# Patient Record
Sex: MALE | Race: WHITE | Employment: FULL TIME | URBAN - METROPOLITAN AREA
[De-identification: names, ages, dates, MRNs, and addresses within clinical notes are randomized per-mention and may not be internally consistent; named-entity substitution may affect disease eponyms.]

---

## 2017-12-06 ENCOUNTER — HOSPITAL ENCOUNTER (EMERGENCY)
Age: 27
Discharge: HOME | End: 2017-12-06
Attending: EMERGENCY MEDICINE
Payer: COMMERCIAL

## 2017-12-06 ENCOUNTER — APPOINTMENT (OUTPATIENT)
Dept: CT IMAGING | Age: 27
End: 2017-12-06
Attending: EMERGENCY MEDICINE
Payer: COMMERCIAL

## 2017-12-06 ENCOUNTER — OFFICE VISIT (OUTPATIENT)
Dept: FAMILY MEDICINE CLINIC | Facility: CLINIC | Age: 27
End: 2017-12-06

## 2017-12-06 VITALS
DIASTOLIC BLOOD PRESSURE: 76 MMHG | OXYGEN SATURATION: 98 % | HEIGHT: 72.4 IN | SYSTOLIC BLOOD PRESSURE: 104 MMHG | RESPIRATION RATE: 16 BRPM | TEMPERATURE: 98 F | WEIGHT: 178 LBS | HEART RATE: 70 BPM | BODY MASS INDEX: 23.85 KG/M2

## 2017-12-06 VITALS
BODY MASS INDEX: 21.82 KG/M2 | HEIGHT: 74 IN | WEIGHT: 170 LBS | SYSTOLIC BLOOD PRESSURE: 124 MMHG | OXYGEN SATURATION: 100 % | DIASTOLIC BLOOD PRESSURE: 73 MMHG | TEMPERATURE: 98 F | RESPIRATION RATE: 18 BRPM | HEART RATE: 61 BPM

## 2017-12-06 DIAGNOSIS — K63.89 EPIPLOIC APPENDAGITIS: Primary | ICD-10-CM

## 2017-12-06 DIAGNOSIS — R10.32 ACUTE LEFT LOWER QUADRANT PAIN: Primary | ICD-10-CM

## 2017-12-06 PROCEDURE — 96361 HYDRATE IV INFUSION ADD-ON: CPT

## 2017-12-06 PROCEDURE — 85025 COMPLETE CBC W/AUTO DIFF WBC: CPT | Performed by: EMERGENCY MEDICINE

## 2017-12-06 PROCEDURE — 80053 COMPREHEN METABOLIC PANEL: CPT | Performed by: EMERGENCY MEDICINE

## 2017-12-06 PROCEDURE — 96374 THER/PROPH/DIAG INJ IV PUSH: CPT

## 2017-12-06 PROCEDURE — 74177 CT ABD & PELVIS W/CONTRAST: CPT | Performed by: EMERGENCY MEDICINE

## 2017-12-06 PROCEDURE — 99284 EMERGENCY DEPT VISIT MOD MDM: CPT

## 2017-12-06 RX ORDER — KETOROLAC TROMETHAMINE 30 MG/ML
30 INJECTION, SOLUTION INTRAMUSCULAR; INTRAVENOUS ONCE
Status: COMPLETED | OUTPATIENT
Start: 2017-12-06 | End: 2017-12-06

## 2017-12-06 RX ORDER — SODIUM CHLORIDE 9 MG/ML
INJECTION, SOLUTION INTRAVENOUS ONCE
Status: COMPLETED | OUTPATIENT
Start: 2017-12-06 | End: 2017-12-06

## 2017-12-06 RX ORDER — HYDROCODONE BITARTRATE AND ACETAMINOPHEN 5; 325 MG/1; MG/1
1-2 TABLET ORAL EVERY 6 HOURS PRN
Qty: 15 TABLET | Refills: 0 | Status: SHIPPED | OUTPATIENT
Start: 2017-12-06

## 2017-12-06 NOTE — PATIENT INSTRUCTIONS
- go to [place] emergency department for evaluation, as we are concerned about diverticulitis  - don't eat or drink anything in case you need further evaluation   Diverticulitis    Some people get pouches along the wall of the colon as they get older.  Paty Bains This cleans out the colon pouches that already exist and may prevent new ones from forming. Foods high in fiber include fresh fruits and edible peelings, raw or lightly cooked vegetables, whole grain cereals and breads, dried beans and peas, and bran.   Oth

## 2017-12-06 NOTE — ED PROVIDER NOTES
Patient Seen in: THE [facility] Emergency Department In [place]    History   Patient presents with:  Abdomen/Flank Pain (GI/)    Stated Complaint: abd pain    HPI    Patient is a 61-year-old male presents emergency room with history of left lower quadrant a stridor. LUNGS: Clear to auscultation bilaterally with no wheeze. There is good equal air entry bilaterally. HEART: Regular rate and rhythm. Normal S1, S2 no S3, or S4. No murmur.   ABDOMEN: There is mild focal tenderness to palpation appreciated to the l above.    Dictated by: Zhanna Bhatt MD on 12/06/2017 at 12:11     Approved by: Zhanna Bhatt MD                The patient had IV line established and have blood work drawn including a CBC, chemistries, BUN and creatinine, and blood sugar all of which are unr

## 2017-12-06 NOTE — PROGRESS NOTES
CHIEF COMPLAINT:   Patient presents with:  Abdominal Pain: Left side. 2 days. HPI:   Fab Luciano is a 24year old male who presents for complaints of abdominal pain that started on Monday night, 2 days ago.   The pain occurred 4 hours after eatin Sitting, Cuff Size: adult)   Pulse 70   Temp 98.3 °F (36.8 °C) (Oral)   Resp 16   Ht 72.4\"   Wt 178 lb   SpO2 98%   BMI 23.88 kg/m²   GENERAL: well developed, well nourished,in no apparent distress  SKIN: no rashes,no suspicious lesions  HEAD: atraumatic,

## 2017-12-06 NOTE — ED INITIAL ASSESSMENT (HPI)
C/o constant LLQ abd pain since Monday. No vomiting/diarrhea. Had regular BM today. Pain worse after eating. Denies of urinary symptoms.

## 2018-07-05 ENCOUNTER — OFFICE VISIT (OUTPATIENT)
Dept: FAMILY MEDICINE CLINIC | Facility: CLINIC | Age: 28
End: 2018-07-05

## 2018-07-05 VITALS
OXYGEN SATURATION: 99 % | HEIGHT: 74 IN | WEIGHT: 170 LBS | RESPIRATION RATE: 20 BRPM | TEMPERATURE: 98 F | SYSTOLIC BLOOD PRESSURE: 120 MMHG | DIASTOLIC BLOOD PRESSURE: 62 MMHG | HEART RATE: 57 BPM | BODY MASS INDEX: 21.82 KG/M2

## 2018-07-05 DIAGNOSIS — H60.331 ACUTE SWIMMER'S EAR OF RIGHT SIDE: ICD-10-CM

## 2018-07-05 DIAGNOSIS — H66.90 ACUTE OTITIS MEDIA, UNSPECIFIED OTITIS MEDIA TYPE: ICD-10-CM

## 2018-07-05 PROCEDURE — 99213 OFFICE O/P EST LOW 20 MIN: CPT | Performed by: NURSE PRACTITIONER

## 2018-07-05 RX ORDER — OMEPRAZOLE 10 MG/1
10 CAPSULE, DELAYED RELEASE ORAL DAILY
COMMUNITY

## 2018-07-05 RX ORDER — AMOXICILLIN 875 MG/1
875 TABLET, COATED ORAL 2 TIMES DAILY
Qty: 20 TABLET | Refills: 0 | Status: SHIPPED | OUTPATIENT
Start: 2018-07-05 | End: 2018-07-15

## 2018-07-05 NOTE — PROGRESS NOTES
CHIEF COMPLAINT:   Patient presents with:  Ear Pain: RT ear pain x 1 wk      HPI:   Arminda Dugan is a 34year old male who presents to clinic today with complaints of right ear pain. Has had for 1  weeks. Pain is described as throbbing.   Patient reports EARS: normal tragus no tender with manipulation. External auditory canals red on right, speck of metal noted in left canal. Right TM: red, intact, + bulging, + retraction,+ effusion, bony landmarks obsured.   Left TM: normal, no bulging, no retraction,no e Follow up with PCP if s/sx worsen, do not begin to improve in 3 days, or if fever of 100.4 or greater persists for 72 hours. Patient voiced understand and is in agreement with treatment plan.     Patient Instructions       External Ear Infection (Adult · If you feel water trapped in your ear, use ear drops right away. You can get these drops over the counter at most drugstores.  They work by removing water from the ear canal.  Follow-up care  Follow up with your healthcare provider in 1 week, or as advise · You may use over-the-counter medicine, such as acetaminophen or ibuprofen, to control pain and fever, unless something else was prescribed.  If you have chronic liver or kidney disease or have ever had a stomach ulcer or gastrointestinal bleeding, talk wi

## 2018-07-05 NOTE — PATIENT INSTRUCTIONS
External Ear Infection (Adult)    External otitis (also called “swimmer’s ear”) is an infection in the ear canal. It is often caused by bacteria or fungus. It can occur a few days after water gets trapped in the ear canal (from swimming or bathing).  It Call your healthcare provider right away if any of these occur:  · Ear pain becomes worse or doesn’t improve after 3 days of treatment  · Redness or swelling of the outer ear occurs or gets worse  · Headache  · Painful or stiff neck  · Drowsiness or confus When to seek medical advice  Call your healthcare provider right away if any of these occur:  · Ear pain gets worse or does not improve after 3 days of treatment  · Unusual drowsiness or confusion  · Neck pain, stiff neck, or headache  · Fluid or blood laith

## 2018-07-21 ENCOUNTER — OFFICE VISIT (OUTPATIENT)
Dept: FAMILY MEDICINE CLINIC | Facility: CLINIC | Age: 28
End: 2018-07-21
Payer: COMMERCIAL

## 2018-07-21 VITALS
OXYGEN SATURATION: 99 % | HEART RATE: 61 BPM | SYSTOLIC BLOOD PRESSURE: 100 MMHG | RESPIRATION RATE: 20 BRPM | HEIGHT: 74 IN | WEIGHT: 170 LBS | TEMPERATURE: 98 F | BODY MASS INDEX: 21.82 KG/M2 | DIASTOLIC BLOOD PRESSURE: 64 MMHG

## 2018-07-21 DIAGNOSIS — H65.191 ACUTE MEE (MIDDLE EAR EFFUSION), RIGHT: ICD-10-CM

## 2018-07-21 DIAGNOSIS — H60.311 ACUTE DIFFUSE OTITIS EXTERNA OF RIGHT EAR: Primary | ICD-10-CM

## 2018-07-21 PROCEDURE — 99213 OFFICE O/P EST LOW 20 MIN: CPT | Performed by: NURSE PRACTITIONER

## 2018-07-21 RX ORDER — CIPROFLOXACIN AND DEXAMETHASONE 3; 1 MG/ML; MG/ML
4 SUSPENSION/ DROPS AURICULAR (OTIC) 2 TIMES DAILY
Qty: 1 BOTTLE | Refills: 0 | Status: SHIPPED | OUTPATIENT
Start: 2018-07-21 | End: 2018-07-21

## 2018-07-21 NOTE — PROGRESS NOTES
CHIEF COMPLAINT:   Patient presents with:  Ear Pain: RT ear pain x 7/4      HPI:   Lavon Rasheed is a 34year old male who presents to clinic today with complaints of right ear pain. Has had for 3  weeks.  Pain is described as pressure, occasionally sharp EARS: bilateral tragus not tender with manipulation. External auditory canals with mild erythema and edema to right canal. Right TM: without erythema, no bulging, no retraction,+ effusion, bony landmarks visible.   Left TM: without erythema, no bulging, no · If fever, worse pain, ear swells shut, hearing loss, pain behind ear, go to the nearest emergency room, or call your primary doctor, or return to immediate care. · Tylenol(acetaminphen) and/or ibuprofen for pain.   · Do not exceed dosing on tylenol for w · You may use acetaminophen or ibuprofen to control pain, unless another medicine was prescribed.  Note: If you have chronic liver or kidney disease or ever had a stomach ulcer or GI bleeding, talk to your healthcare provider before taking any of these medi OME can happen when you have a cold if congestion blocks the passage that drains the middle ear. This passage is called the eustachian tube. OME may also occur with nasal allergies or after a bacterial middle ear infection.     The pain or discomfort may co · Antihistamines may help if you are also having allergy symptoms. · You may use medicines such as guaifenesin to thin mucus and promote drainage.   Follow-up care  Follow up with your healthcare provider or as advised if you are not feeling better after 3

## 2018-07-21 NOTE — PATIENT INSTRUCTIONS
FLonase as directed  Sudafed as needed  Benadryl at night      Otitis Externa   · Don't use Q-tips. Keep ear dry. Wear cotton ball in ear during shower. No swimming or head submersion for 7 days and infection cleared. · Use antibiotic drops x 7 days.  [facility] · Use prescribed ear drops as directed. These help reduce swelling and fight the infection. If an ear wick was placed in the ear canal, apply drops right onto the end of the wick.  The wick will draw the medicine into the ear canal even if it is swollen rena Earaches can happen without an infection. This occurs when air and fluid build up behind the eardrum causing a feeling of fullness and discomfort and reduced hearing. This is called otitis media with effusion (OME) or serous otitis media.  It means there is · You may use over-the-counter decongestants such as phenylephrine or pseudoephedrine. But they are not always helpful. Don't use nasal spray decongestants more than 3 days. Longer use can make congestion worse.  Prescription nasal sprays from your doctor d

## 2021-12-30 ENCOUNTER — HOSPITAL ENCOUNTER (EMERGENCY)
Age: 31
Discharge: HOME | End: 2021-12-30
Attending: EMERGENCY MEDICINE
Payer: COMMERCIAL

## 2021-12-30 VITALS
OXYGEN SATURATION: 99 % | HEART RATE: 70 BPM | WEIGHT: 180 LBS | BODY MASS INDEX: 23 KG/M2 | RESPIRATION RATE: 20 BRPM | TEMPERATURE: 98 F | DIASTOLIC BLOOD PRESSURE: 77 MMHG | SYSTOLIC BLOOD PRESSURE: 120 MMHG

## 2021-12-30 DIAGNOSIS — Z51.89 VISIT FOR WOUND CHECK: Primary | ICD-10-CM

## 2021-12-30 PROCEDURE — 99281 EMR DPT VST MAYX REQ PHY/QHP: CPT | Performed by: EMERGENCY MEDICINE

## 2021-12-31 NOTE — ED PROVIDER NOTES
Patient Seen in: [facility] Emergency Department In [place]      History   Patient presents with:  Laceration/Abrasion    Stated Complaint: left thumb lac 12/26 and here for f/u    Subjective:   HPI    Patient is a pleasant 56-year-old male.   4 days prior of lidocaine and was then able to successfully remove the remaining of the Surgicel. Small amount of active bleeding. Site was thoroughly cleaned. We will now switch to a nonadherent dressing and bulky tube gauze. He is to change daily.
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
## 2022-10-26 ENCOUNTER — OFFICE VISIT (OUTPATIENT)
Dept: FAMILY MEDICINE CLINIC | Facility: CLINIC | Age: 32
End: 2022-10-26
Payer: COMMERCIAL

## 2022-10-26 VITALS
HEIGHT: 72.05 IN | HEART RATE: 74 BPM | TEMPERATURE: 97 F | SYSTOLIC BLOOD PRESSURE: 126 MMHG | DIASTOLIC BLOOD PRESSURE: 68 MMHG | RESPIRATION RATE: 18 BRPM | WEIGHT: 175 LBS | BODY MASS INDEX: 23.7 KG/M2 | OXYGEN SATURATION: 97 %

## 2022-10-26 DIAGNOSIS — Z13.21 SCREENING FOR ENDOCRINE, NUTRITIONAL, METABOLIC AND IMMUNITY DISORDER: ICD-10-CM

## 2022-10-26 DIAGNOSIS — Z13.228 SCREENING FOR ENDOCRINE, NUTRITIONAL, METABOLIC AND IMMUNITY DISORDER: ICD-10-CM

## 2022-10-26 DIAGNOSIS — Z13.29 SCREENING FOR ENDOCRINE, NUTRITIONAL, METABOLIC AND IMMUNITY DISORDER: ICD-10-CM

## 2022-10-26 DIAGNOSIS — Z13.0 SCREENING FOR ENDOCRINE, NUTRITIONAL, METABOLIC AND IMMUNITY DISORDER: ICD-10-CM

## 2022-10-26 DIAGNOSIS — F41.1 GAD (GENERALIZED ANXIETY DISORDER): ICD-10-CM

## 2022-10-26 DIAGNOSIS — Z13.6 ENCOUNTER FOR LIPID SCREENING FOR CARDIOVASCULAR DISEASE: ICD-10-CM

## 2022-10-26 DIAGNOSIS — F90.0 ATTENTION DEFICIT HYPERACTIVITY DISORDER (ADHD), PREDOMINANTLY INATTENTIVE TYPE: ICD-10-CM

## 2022-10-26 DIAGNOSIS — R00.2 PALPITATIONS: ICD-10-CM

## 2022-10-26 DIAGNOSIS — Z13.220 ENCOUNTER FOR LIPID SCREENING FOR CARDIOVASCULAR DISEASE: ICD-10-CM

## 2022-10-26 DIAGNOSIS — G57.93 NEUROPATHY INVOLVING BOTH LOWER EXTREMITIES: ICD-10-CM

## 2022-10-26 DIAGNOSIS — F32.1 MODERATE MAJOR DEPRESSION (HCC): ICD-10-CM

## 2022-10-26 DIAGNOSIS — Z00.00 WELL ADULT EXAM: Primary | ICD-10-CM

## 2022-10-26 DIAGNOSIS — G43.109 MIGRAINE WITH AURA AND WITHOUT STATUS MIGRAINOSUS, NOT INTRACTABLE: ICD-10-CM

## 2022-10-26 PROCEDURE — 3008F BODY MASS INDEX DOCD: CPT | Performed by: FAMILY MEDICINE

## 2022-10-26 PROCEDURE — 3078F DIAST BP <80 MM HG: CPT | Performed by: FAMILY MEDICINE

## 2022-10-26 PROCEDURE — 99385 PREV VISIT NEW AGE 18-39: CPT | Performed by: FAMILY MEDICINE

## 2022-10-26 PROCEDURE — 3074F SYST BP LT 130 MM HG: CPT | Performed by: FAMILY MEDICINE

## 2022-10-26 PROCEDURE — 99214 OFFICE O/P EST MOD 30 MIN: CPT | Performed by: FAMILY MEDICINE

## 2022-10-26 RX ORDER — SUMATRIPTAN 100 MG/1
TABLET, FILM COATED ORAL
Qty: 9 TABLET | Refills: 0 | Status: SHIPPED | OUTPATIENT
Start: 2022-10-26

## 2022-10-27 PROBLEM — F90.0 ATTENTION DEFICIT HYPERACTIVITY DISORDER (ADHD), PREDOMINANTLY INATTENTIVE TYPE: Status: ACTIVE | Noted: 2022-10-27

## 2022-10-27 PROBLEM — G57.93 NEUROPATHY INVOLVING BOTH LOWER EXTREMITIES: Status: ACTIVE | Noted: 2022-10-27

## 2022-10-27 PROBLEM — G43.109 MIGRAINE WITH AURA AND WITHOUT STATUS MIGRAINOSUS, NOT INTRACTABLE: Status: ACTIVE | Noted: 2022-10-27

## 2022-10-27 PROBLEM — F41.1 GAD (GENERALIZED ANXIETY DISORDER): Status: ACTIVE | Noted: 2022-10-27

## 2022-10-27 PROBLEM — F32.1 MODERATE MAJOR DEPRESSION (HCC): Status: ACTIVE | Noted: 2022-10-27

## 2022-10-27 PROBLEM — R00.2 PALPITATIONS: Status: ACTIVE | Noted: 2022-10-27

## 2022-11-13 LAB
ABSOLUTE BASOPHILS: 32 CELLS/UL (ref 0–200)
ABSOLUTE EOSINOPHILS: 122 CELLS/UL (ref 15–500)
ABSOLUTE LYMPHOCYTES: 1498 CELLS/UL (ref 850–3900)
ABSOLUTE MONOCYTES: 710 CELLS/UL (ref 200–950)
ABSOLUTE NEUTROPHILS: 4038 CELLS/UL (ref 1500–7800)
ALBUMIN/GLOBULIN RATIO: 2 (CALC) (ref 1–2.5)
ALBUMIN: 5 G/DL (ref 3.6–5.1)
ALKALINE PHOSPHATASE: 80 U/L (ref 36–130)
ALT: 85 U/L (ref 9–46)
AST: 53 U/L (ref 10–40)
BASOPHILS: 0.5 %
BILIRUBIN, TOTAL: 0.6 MG/DL (ref 0.2–1.2)
BUN: 17 MG/DL (ref 7–25)
CALCIUM: 9.9 MG/DL (ref 8.6–10.3)
CARBON DIOXIDE: 31 MMOL/L (ref 20–32)
CHLORIDE: 103 MMOL/L (ref 98–110)
CHOL/HDLC RATIO: 3.5 (CALC)
CHOLESTEROL, TOTAL: 187 MG/DL
CREATININE: 0.81 MG/DL (ref 0.6–1.26)
EGFR: 122 ML/MIN/1.73M2
EOSINOPHILS: 1.9 %
GLOBULIN: 2.5 G/DL (CALC) (ref 1.9–3.7)
GLUCOSE: 78 MG/DL (ref 65–99)
HDL CHOLESTEROL: 53 MG/DL
HEMATOCRIT: 50.4 % (ref 38.5–50)
HEMOGLOBIN: 17.3 G/DL (ref 13.2–17.1)
LDL-CHOLESTEROL: 110 MG/DL (CALC)
LYMPHOCYTES: 23.4 %
MAGNESIUM: 2.1 MG/DL (ref 1.5–2.5)
MCH: 30.2 PG (ref 27–33)
MCHC: 34.3 G/DL (ref 32–36)
MCV: 88.1 FL (ref 80–100)
MONOCYTES: 11.1 %
MPV: 11.3 FL (ref 7.5–12.5)
NEUTROPHILS: 63.1 %
NON-HDL CHOLESTEROL: 134 MG/DL (CALC)
PLATELET COUNT: 200 THOUSAND/UL (ref 140–400)
POTASSIUM: 4.1 MMOL/L (ref 3.5–5.3)
PROTEIN, TOTAL: 7.5 G/DL (ref 6.1–8.1)
RDW: 12.4 % (ref 11–15)
RED BLOOD CELL COUNT: 5.72 MILLION/UL (ref 4.2–5.8)
SODIUM: 141 MMOL/L (ref 135–146)
TRIGLYCERIDES: 126 MG/DL
TSH: 0.56 MIU/L (ref 0.4–4.5)
VITAMIN B12: 512 PG/ML (ref 200–1100)
WHITE BLOOD CELL COUNT: 6.4 THOUSAND/UL (ref 3.8–10.8)

## 2022-11-14 DIAGNOSIS — R74.8 ELEVATED LIVER ENZYMES: Primary | ICD-10-CM

## 2022-11-14 DIAGNOSIS — R71.8 OTHER ABNORMALITY OF RED BLOOD CELLS: ICD-10-CM

## 2022-11-16 ENCOUNTER — TELEPHONE (OUTPATIENT)
Dept: FAMILY MEDICINE CLINIC | Facility: CLINIC | Age: 32
End: 2022-11-16

## 2022-11-16 DIAGNOSIS — R74.8 ELEVATED LIVER ENZYMES: Primary | ICD-10-CM

## 2022-11-16 NOTE — TELEPHONE ENCOUNTER
----- Message from Beba Grider PA-C sent at 11/16/2022  4:41 AM CST -----  Needs acute hepatitis panel added not the LFT     Poly  Can you contact [facility] and have them delete the hepatic panel you added and have them due the Acute Hepatitis Panel instead per Devante Hill above instructions.

## 2022-11-16 NOTE — TELEPHONE ENCOUNTER
Request made to [facility] to add on Acute Hepatitis panel and cancel the HFP requested yesterday. Order pended. Can you check for accuracy please?

## 2022-11-21 LAB
ABSOLUTE BASOPHILS: 32 CELLS/UL (ref 0–200)
ABSOLUTE EOSINOPHILS: 122 CELLS/UL (ref 15–500)
ABSOLUTE LYMPHOCYTES: 1498 CELLS/UL (ref 850–3900)
ABSOLUTE MONOCYTES: 710 CELLS/UL (ref 200–950)
ABSOLUTE NEUTROPHILS: 4038 CELLS/UL (ref 1500–7800)
ALBUMIN/GLOBULIN RATIO: 2 (CALC) (ref 1–2.5)
ALBUMIN/GLOBULIN RATIO: 2 (CALC) (ref 1–2.5)
ALBUMIN: 5 G/DL (ref 3.6–5.1)
ALBUMIN: 5 G/DL (ref 3.6–5.1)
ALKALINE PHOSPHATASE: 80 U/L (ref 36–130)
ALKALINE PHOSPHATASE: 80 U/L (ref 36–130)
ALT: 85 U/L (ref 9–46)
ALT: 85 U/L (ref 9–46)
AST: 53 U/L (ref 10–40)
AST: 53 U/L (ref 10–40)
BASOPHILS: 0.5 %
BILIRUBIN, DIRECT: 0.1 MG/DL
BILIRUBIN, INDIRECT: 0.5 MG/DL (CALC) (ref 0.2–1.2)
BILIRUBIN, TOTAL: 0.6 MG/DL (ref 0.2–1.2)
BILIRUBIN, TOTAL: 0.6 MG/DL (ref 0.2–1.2)
BUN: 17 MG/DL (ref 7–25)
CALCIUM: 9.9 MG/DL (ref 8.6–10.3)
CARBON DIOXIDE: 31 MMOL/L (ref 20–32)
CHLORIDE: 103 MMOL/L (ref 98–110)
CHOL/HDLC RATIO: 3.5 (CALC)
CHOLESTEROL, TOTAL: 187 MG/DL
CREATININE: 0.81 MG/DL (ref 0.6–1.26)
EGFR: 122 ML/MIN/1.73M2
EOSINOPHILS: 1.9 %
GLOBULIN: 2.5 G/DL (CALC) (ref 1.9–3.7)
GLOBULIN: 2.5 G/DL (CALC) (ref 1.9–3.7)
GLUCOSE: 78 MG/DL (ref 65–99)
HDL CHOLESTEROL: 53 MG/DL
HEMATOCRIT: 50.4 % (ref 38.5–50)
HEMOGLOBIN: 17.3 G/DL (ref 13.2–17.1)
LDL-CHOLESTEROL: 110 MG/DL (CALC)
LYMPHOCYTES: 23.4 %
MAGNESIUM: 2.1 MG/DL (ref 1.5–2.5)
MCH: 30.2 PG (ref 27–33)
MCHC: 34.3 G/DL (ref 32–36)
MCV: 88.1 FL (ref 80–100)
MONOCYTES: 11.1 %
MPV: 11.3 FL (ref 7.5–12.5)
NEUTROPHILS: 63.1 %
NON-HDL CHOLESTEROL: 134 MG/DL (CALC)
PLATELET COUNT: 200 THOUSAND/UL (ref 140–400)
POTASSIUM: 4.1 MMOL/L (ref 3.5–5.3)
PROTEIN, TOTAL: 7.5 G/DL (ref 6.1–8.1)
PROTEIN, TOTAL: 7.5 G/DL (ref 6.1–8.1)
RDW: 12.4 % (ref 11–15)
RED BLOOD CELL COUNT: 5.72 MILLION/UL (ref 4.2–5.8)
SIGNAL TO CUT-OFF: 0.02
SODIUM: 141 MMOL/L (ref 135–146)
TRIGLYCERIDES: 126 MG/DL
TSH: 0.56 MIU/L (ref 0.4–4.5)
VITAMIN B12: 512 PG/ML (ref 200–1100)
WHITE BLOOD CELL COUNT: 6.4 THOUSAND/UL (ref 3.8–10.8)

## 2022-11-28 ENCOUNTER — OFFICE VISIT (OUTPATIENT)
Dept: FAMILY MEDICINE CLINIC | Facility: CLINIC | Age: 32
End: 2022-11-28
Payer: COMMERCIAL

## 2022-11-28 VITALS
BODY MASS INDEX: 23.98 KG/M2 | HEART RATE: 74 BPM | WEIGHT: 177 LBS | OXYGEN SATURATION: 98 % | DIASTOLIC BLOOD PRESSURE: 74 MMHG | HEIGHT: 72 IN | TEMPERATURE: 100 F | RESPIRATION RATE: 18 BRPM | SYSTOLIC BLOOD PRESSURE: 130 MMHG

## 2022-11-28 DIAGNOSIS — F90.0 ATTENTION DEFICIT HYPERACTIVITY DISORDER (ADHD), PREDOMINANTLY INATTENTIVE TYPE: ICD-10-CM

## 2022-11-28 DIAGNOSIS — F32.1 MODERATE MAJOR DEPRESSION (HCC): ICD-10-CM

## 2022-11-28 DIAGNOSIS — R79.89 ELEVATED LIVER FUNCTION TESTS: ICD-10-CM

## 2022-11-28 DIAGNOSIS — R00.2 PALPITATIONS: ICD-10-CM

## 2022-11-28 DIAGNOSIS — G44.209 TENSION HEADACHE: ICD-10-CM

## 2022-11-28 DIAGNOSIS — G43.109 MIGRAINE WITH AURA AND WITHOUT STATUS MIGRAINOSUS, NOT INTRACTABLE: Primary | ICD-10-CM

## 2022-11-28 DIAGNOSIS — F41.1 GAD (GENERALIZED ANXIETY DISORDER): ICD-10-CM

## 2022-11-28 DIAGNOSIS — Z23 NEED FOR INFLUENZA VACCINATION: ICD-10-CM

## 2022-11-28 DIAGNOSIS — Z79.899 NEW MEDICATION ADDED: ICD-10-CM

## 2022-11-28 PROCEDURE — 90686 IIV4 VACC NO PRSV 0.5 ML IM: CPT | Performed by: FAMILY MEDICINE

## 2022-11-28 PROCEDURE — 3008F BODY MASS INDEX DOCD: CPT | Performed by: FAMILY MEDICINE

## 2022-11-28 PROCEDURE — 90471 IMMUNIZATION ADMIN: CPT | Performed by: FAMILY MEDICINE

## 2022-11-28 PROCEDURE — 3078F DIAST BP <80 MM HG: CPT | Performed by: FAMILY MEDICINE

## 2022-11-28 PROCEDURE — 99215 OFFICE O/P EST HI 40 MIN: CPT | Performed by: FAMILY MEDICINE

## 2022-11-28 PROCEDURE — 93000 ELECTROCARDIOGRAM COMPLETE: CPT | Performed by: FAMILY MEDICINE

## 2022-11-28 PROCEDURE — 3075F SYST BP GE 130 - 139MM HG: CPT | Performed by: FAMILY MEDICINE

## 2022-11-28 RX ORDER — PROPRANOLOL HCL 60 MG
60 CAPSULE, EXTENDED RELEASE 24HR ORAL DAILY
Qty: 30 CAPSULE | Refills: 1 | Status: SHIPPED | OUTPATIENT
Start: 2022-11-28 | End: 2022-12-28

## 2022-11-28 RX ORDER — FLUOXETINE HYDROCHLORIDE 20 MG/1
20 CAPSULE ORAL DAILY
Qty: 30 CAPSULE | Refills: 1 | Status: SHIPPED | OUTPATIENT
Start: 2022-11-28

## 2022-11-29 PROBLEM — R79.89 ELEVATED LIVER FUNCTION TESTS: Status: ACTIVE | Noted: 2022-11-29

## 2022-12-29 ENCOUNTER — TELEPHONE (OUTPATIENT)
Dept: FAMILY MEDICINE CLINIC | Facility: CLINIC | Age: 32
End: 2022-12-29

## 2022-12-29 DIAGNOSIS — R00.2 PALPITATIONS: Primary | ICD-10-CM

## 2022-12-29 DIAGNOSIS — R79.89 ELEVATED LIVER FUNCTION TESTS: Primary | ICD-10-CM

## 2022-12-29 LAB
ABSOLUTE BASOPHILS: 28 CELLS/UL (ref 0–200)
ABSOLUTE EOSINOPHILS: 78 CELLS/UL (ref 15–500)
ABSOLUTE LYMPHOCYTES: 1618 CELLS/UL (ref 850–3900)
ABSOLUTE MONOCYTES: 661 CELLS/UL (ref 200–950)
ABSOLUTE NEUTROPHILS: 3214 CELLS/UL (ref 1500–7800)
ALBUMIN/GLOBULIN RATIO: 1.9 (CALC) (ref 1–2.5)
ALBUMIN: 4.9 G/DL (ref 3.6–5.1)
ALKALINE PHOSPHATASE: 84 U/L (ref 36–130)
ALT: 50 U/L (ref 9–46)
AST: 22 U/L (ref 10–40)
BASOPHILS: 0.5 %
BILIRUBIN, DIRECT: 0.1 MG/DL
BILIRUBIN, INDIRECT: 0.7 MG/DL (CALC) (ref 0.2–1.2)
BILIRUBIN, TOTAL: 0.8 MG/DL (ref 0.2–1.2)
EOSINOPHILS: 1.4 %
GLOBULIN: 2.6 G/DL (CALC) (ref 1.9–3.7)
HEMATOCRIT: 47.8 % (ref 38.5–50)
HEMOGLOBIN: 16.6 G/DL (ref 13.2–17.1)
LYMPHOCYTES: 28.9 %
MCH: 30 PG (ref 27–33)
MCHC: 34.7 G/DL (ref 32–36)
MCV: 86.4 FL (ref 80–100)
MONOCYTES: 11.8 %
MPV: 11.6 FL (ref 7.5–12.5)
NEUTROPHILS: 57.4 %
PLATELET COUNT: 205 THOUSAND/UL (ref 140–400)
PROTEIN, TOTAL: 7.5 G/DL (ref 6.1–8.1)
RDW: 12.3 % (ref 11–15)
RED BLOOD CELL COUNT: 5.53 MILLION/UL (ref 4.2–5.8)
SIGNAL TO CUT-OFF: 0.15
WHITE BLOOD CELL COUNT: 5.6 THOUSAND/UL (ref 3.8–10.8)

## 2022-12-29 NOTE — TELEPHONE ENCOUNTER
----- Message from Ralph Song PA-C sent at 12/29/2022  5:23 AM CST -----  Liver function tests are improving continue with trying to avoid processed food, acetaminophen and alcohol and repeat liver function tests in 3 months. Complete blood count is normal no repeat is needed in 6 months as prior directed this was done a little early but since it is normal no repeat needed. LFT in 3 months    Provider placed lab order and left vm for pt with above results/instructions.

## 2022-12-29 NOTE — TELEPHONE ENCOUNTER
----- Message from Cristina Leggett PA-C sent at 12/29/2022  5:21 AM CST -----  A CBC was supposed to be done in 6 months not now and hepatitis panel was done twice,  it was not supposed to be repeated it was just added on in November. This was an error on our part I believe.   See other TE from today

## 2023-01-06 ENCOUNTER — TELEPHONE (OUTPATIENT)
Dept: FAMILY MEDICINE CLINIC | Facility: CLINIC | Age: 33
End: 2023-01-06

## 2023-01-07 PROBLEM — F33.40 DEPRESSION, MAJOR, RECURRENT, IN REMISSION (HCC): Status: ACTIVE | Noted: 2023-01-07

## 2023-01-07 PROBLEM — Z79.899 MEDICATION MANAGEMENT: Status: ACTIVE | Noted: 2023-01-07

## 2023-01-07 PROBLEM — F32.1 MODERATE MAJOR DEPRESSION (HCC): Status: RESOLVED | Noted: 2022-10-27 | Resolved: 2023-01-07

## 2023-01-07 PROBLEM — F33.40 DEPRESSION, MAJOR, RECURRENT, IN REMISSION: Status: ACTIVE | Noted: 2023-01-07

## 2023-01-09 NOTE — TELEPHONE ENCOUNTER
PA for Vyvanse denied by plan. I did advise patient to call plan to see if there is a formulary alternative. He will do so and call us back.

## 2023-02-10 PROBLEM — G57.93 NEUROPATHY INVOLVING BOTH LOWER EXTREMITIES: Status: RESOLVED | Noted: 2022-10-27 | Resolved: 2023-02-10

## 2023-02-10 PROBLEM — R00.2 PALPITATIONS: Status: RESOLVED | Noted: 2022-10-27 | Resolved: 2023-02-10

## 2023-03-19 DIAGNOSIS — F90.0 ATTENTION DEFICIT HYPERACTIVITY DISORDER (ADHD), PREDOMINANTLY INATTENTIVE TYPE: ICD-10-CM

## 2023-03-20 RX ORDER — DEXMETHYLPHENIDATE HYDROCHLORIDE 15 MG/1
15 CAPSULE, EXTENDED RELEASE ORAL DAILY
Qty: 30 CAPSULE | Refills: 0 | OUTPATIENT
Start: 2023-03-20 | End: 2023-04-19

## 2023-04-07 DIAGNOSIS — F41.1 GAD (GENERALIZED ANXIETY DISORDER): ICD-10-CM

## 2023-04-07 DIAGNOSIS — F33.40 DEPRESSION, MAJOR, RECURRENT, IN REMISSION (HCC): ICD-10-CM

## 2023-04-07 RX ORDER — FLUOXETINE HYDROCHLORIDE 20 MG/1
20 CAPSULE ORAL DAILY
Qty: 90 CAPSULE | Refills: 0 | Status: SHIPPED | OUTPATIENT
Start: 2023-04-07

## 2023-04-07 RX ORDER — FLUOXETINE 10 MG/1
10 CAPSULE ORAL DAILY
Qty: 90 CAPSULE | Refills: 0 | Status: SHIPPED | OUTPATIENT
Start: 2023-04-07

## 2023-05-16 PROBLEM — K21.9 GASTROESOPHAGEAL REFLUX DISEASE WITHOUT ESOPHAGITIS: Status: ACTIVE | Noted: 2023-05-16

## 2023-07-04 ENCOUNTER — APPOINTMENT (OUTPATIENT)
Dept: GENERAL RADIOLOGY | Age: 33
End: 2023-07-04
Attending: EMERGENCY MEDICINE
Payer: COMMERCIAL

## 2023-07-04 ENCOUNTER — APPOINTMENT (OUTPATIENT)
Dept: CT IMAGING | Age: 33
End: 2023-07-04
Attending: EMERGENCY MEDICINE
Payer: COMMERCIAL

## 2023-07-04 ENCOUNTER — APPOINTMENT (OUTPATIENT)
Dept: ULTRASOUND IMAGING | Age: 33
End: 2023-07-04
Attending: EMERGENCY MEDICINE
Payer: COMMERCIAL

## 2023-07-04 ENCOUNTER — HOSPITAL ENCOUNTER (EMERGENCY)
Age: 33
Discharge: HOME | End: 2023-07-04
Attending: EMERGENCY MEDICINE
Payer: COMMERCIAL

## 2023-07-04 VITALS
RESPIRATION RATE: 16 BRPM | TEMPERATURE: 98 F | HEART RATE: 102 BPM | HEIGHT: 73 IN | DIASTOLIC BLOOD PRESSURE: 70 MMHG | WEIGHT: 171 LBS | OXYGEN SATURATION: 99 % | BODY MASS INDEX: 22.66 KG/M2 | SYSTOLIC BLOOD PRESSURE: 120 MMHG
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
## 2023-07-04 DIAGNOSIS — S62.002A CLOSED NONDISPLACED FRACTURE OF SCAPHOID OF LEFT WRIST, UNSPECIFIED PORTION OF SCAPHOID, INITIAL ENCOUNTER: ICD-10-CM

## 2023-07-04 DIAGNOSIS — S30.201A TRAUMATIC HEMATOMA OF TESTICLE: Primary | ICD-10-CM

## 2023-07-04 DIAGNOSIS — T07.XXXA ABRASIONS OF MULTIPLE SITES: ICD-10-CM

## 2023-07-04 LAB
ALBUMIN SERPL-MCNC: 4.2 G/DL (ref 3.4–5)
ALBUMIN/GLOB SERPL: 1.2 {RATIO} (ref 1–2)
ALP LIVER SERPL-CCNC: 89 U/L
ALT SERPL-CCNC: 76 U/L
ANION GAP SERPL CALC-SCNC: 2 MMOL/L (ref 0–18)
APTT PPP: 26.1 SECONDS (ref 23.3–35.6)
AST SERPL-CCNC: 44 U/L (ref 15–37)
BASOPHILS # BLD AUTO: 0.02 X10(3) UL (ref 0–0.2)
BASOPHILS NFR BLD AUTO: 0.2 %
BILIRUB SERPL-MCNC: 1.5 MG/DL (ref 0.1–2)
BILIRUB UR QL STRIP.AUTO: NEGATIVE
BUN BLD-MCNC: 16 MG/DL (ref 7–18)
CALCIUM BLD-MCNC: 9 MG/DL (ref 8.5–10.1)
CHLORIDE SERPL-SCNC: 106 MMOL/L (ref 98–112)
CLARITY UR REFRACT.AUTO: CLEAR
CO2 SERPL-SCNC: 27 MMOL/L (ref 21–32)
COLOR UR AUTO: YELLOW
CREAT BLD-MCNC: 0.72 MG/DL
EOSINOPHIL # BLD AUTO: 0.13 X10(3) UL (ref 0–0.7)
EOSINOPHIL NFR BLD AUTO: 1.2 %
ERYTHROCYTE [DISTWIDTH] IN BLOOD BY AUTOMATED COUNT: 12.6 %
ETHANOL SERPL-MCNC: <3 MG/DL (ref ?–3)
GFR SERPLBLD BASED ON 1.73 SQ M-ARVRAT: 125 ML/MIN/1.73M2 (ref 60–?)
GLOBULIN PLAS-MCNC: 3.5 G/DL (ref 2.8–4.4)
GLUCOSE BLD-MCNC: 93 MG/DL (ref 70–99)
GLUCOSE UR STRIP.AUTO-MCNC: NEGATIVE MG/DL
HCT VFR BLD AUTO: 46.6 %
HGB BLD-MCNC: 16.2 G/DL
IMM GRANULOCYTES # BLD AUTO: 0.04 X10(3) UL (ref 0–1)
IMM GRANULOCYTES NFR BLD: 0.4 %
INR BLD: 1.03 (ref 0.85–1.16)
LEUKOCYTE ESTERASE UR QL STRIP.AUTO: NEGATIVE
LIPASE SERPL-CCNC: 33 U/L (ref 13–75)
LYMPHOCYTES # BLD AUTO: 0.98 X10(3) UL (ref 1–4)
LYMPHOCYTES NFR BLD AUTO: 9 %
MCH RBC QN AUTO: 30.3 PG (ref 26–34)
MCHC RBC AUTO-ENTMCNC: 34.8 G/DL (ref 31–37)
MCV RBC AUTO: 87.1 FL
MONOCYTES # BLD AUTO: 1.52 X10(3) UL (ref 0.1–1)
MONOCYTES NFR BLD AUTO: 14 %
NEUTROPHILS # BLD AUTO: 8.16 X10 (3) UL (ref 1.5–7.7)
NEUTROPHILS # BLD AUTO: 8.16 X10(3) UL (ref 1.5–7.7)
NEUTROPHILS NFR BLD AUTO: 75.2 %
NITRITE UR QL STRIP.AUTO: NEGATIVE
OSMOLALITY SERPL CALC.SUM OF ELEC: 281 MOSM/KG (ref 275–295)
PH UR STRIP.AUTO: 6 [PH] (ref 5–8)
PLATELET # BLD AUTO: 181 10(3)UL (ref 150–450)
POTASSIUM SERPL-SCNC: 3.8 MMOL/L (ref 3.5–5.1)
PROT SERPL-MCNC: 7.7 G/DL (ref 6.4–8.2)
PROTHROMBIN TIME: 13.5 SECONDS (ref 11.6–14.8)
RBC # BLD AUTO: 5.35 X10(6)UL
RBC UR QL AUTO: NEGATIVE
SODIUM SERPL-SCNC: 135 MMOL/L (ref 136–145)
SP GR UR STRIP.AUTO: 1.01 (ref 1–1.03)
TROPONIN I HIGH SENSITIVITY: <3 NG/L
UROBILINOGEN UR STRIP.AUTO-MCNC: 0.2 MG/DL
WBC # BLD AUTO: 10.9 X10(3) UL (ref 4–11)

## 2023-07-04 PROCEDURE — 96376 TX/PRO/DX INJ SAME DRUG ADON: CPT

## 2023-07-04 PROCEDURE — 99285 EMERGENCY DEPT VISIT HI MDM: CPT

## 2023-07-04 PROCEDURE — 73110 X-RAY EXAM OF WRIST: CPT | Performed by: EMERGENCY MEDICINE

## 2023-07-04 PROCEDURE — 93005 ELECTROCARDIOGRAM TRACING: CPT

## 2023-07-04 PROCEDURE — 93010 ELECTROCARDIOGRAM REPORT: CPT

## 2023-07-04 PROCEDURE — 85730 THROMBOPLASTIN TIME PARTIAL: CPT | Performed by: EMERGENCY MEDICINE

## 2023-07-04 PROCEDURE — 73552 X-RAY EXAM OF FEMUR 2/>: CPT | Performed by: EMERGENCY MEDICINE

## 2023-07-04 PROCEDURE — 96374 THER/PROPH/DIAG INJ IV PUSH: CPT

## 2023-07-04 PROCEDURE — 73610 X-RAY EXAM OF ANKLE: CPT | Performed by: EMERGENCY MEDICINE

## 2023-07-04 PROCEDURE — 71260 CT THORAX DX C+: CPT | Performed by: EMERGENCY MEDICINE

## 2023-07-04 PROCEDURE — 81003 URINALYSIS AUTO W/O SCOPE: CPT | Performed by: EMERGENCY MEDICINE

## 2023-07-04 PROCEDURE — 85610 PROTHROMBIN TIME: CPT | Performed by: EMERGENCY MEDICINE

## 2023-07-04 PROCEDURE — 73630 X-RAY EXAM OF FOOT: CPT | Performed by: EMERGENCY MEDICINE

## 2023-07-04 PROCEDURE — 73562 X-RAY EXAM OF KNEE 3: CPT | Performed by: EMERGENCY MEDICINE

## 2023-07-04 PROCEDURE — 84484 ASSAY OF TROPONIN QUANT: CPT | Performed by: EMERGENCY MEDICINE

## 2023-07-04 PROCEDURE — 85025 COMPLETE CBC W/AUTO DIFF WBC: CPT | Performed by: EMERGENCY MEDICINE

## 2023-07-04 PROCEDURE — 93975 VASCULAR STUDY: CPT | Performed by: EMERGENCY MEDICINE

## 2023-07-04 PROCEDURE — 74177 CT ABD & PELVIS W/CONTRAST: CPT | Performed by: EMERGENCY MEDICINE

## 2023-07-04 PROCEDURE — 96361 HYDRATE IV INFUSION ADD-ON: CPT

## 2023-07-04 PROCEDURE — 76870 US EXAM SCROTUM: CPT | Performed by: EMERGENCY MEDICINE

## 2023-07-04 PROCEDURE — 73130 X-RAY EXAM OF HAND: CPT | Performed by: EMERGENCY MEDICINE

## 2023-07-04 PROCEDURE — 82077 ASSAY SPEC XCP UR&BREATH IA: CPT | Performed by: EMERGENCY MEDICINE

## 2023-07-04 PROCEDURE — 83690 ASSAY OF LIPASE: CPT | Performed by: EMERGENCY MEDICINE

## 2023-07-04 PROCEDURE — 80053 COMPREHEN METABOLIC PANEL: CPT | Performed by: EMERGENCY MEDICINE

## 2023-07-04 RX ORDER — NAPROXEN 500 MG/1
500 TABLET ORAL 2 TIMES DAILY PRN
Qty: 20 TABLET | Refills: 0 | Status: SHIPPED | OUTPATIENT
Start: 2023-07-04 | End: 2023-07-14

## 2023-07-04 RX ORDER — NAPROXEN 500 MG/1
500 TABLET ORAL 2 TIMES DAILY PRN
Qty: 20 TABLET | Refills: 0 | Status: SHIPPED | OUTPATIENT
Start: 2023-07-04 | End: 2023-07-04 | Stop reason: CLARIF

## 2023-07-04 RX ORDER — HYDROCODONE BITARTRATE AND ACETAMINOPHEN 5; 325 MG/1; MG/1
1 TABLET ORAL EVERY 6 HOURS PRN
Qty: 10 TABLET | Refills: 0 | Status: SHIPPED | OUTPATIENT
Start: 2023-07-04 | End: 2023-07-09

## 2023-07-04 RX ORDER — MORPHINE SULFATE 4 MG/ML
4 INJECTION, SOLUTION INTRAMUSCULAR; INTRAVENOUS ONCE
Status: COMPLETED | OUTPATIENT
Start: 2023-07-04 | End: 2023-07-04

## 2023-07-04 RX ORDER — HYDROCODONE BITARTRATE AND ACETAMINOPHEN 5; 325 MG/1; MG/1
1-2 TABLET ORAL EVERY 6 HOURS PRN
Qty: 10 TABLET | Refills: 0 | Status: SHIPPED | OUTPATIENT
Start: 2023-07-04 | End: 2023-07-04 | Stop reason: CLARIF
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
## 2023-07-05 ENCOUNTER — TELEPHONE (OUTPATIENT)
Dept: FAMILY MEDICINE CLINIC | Facility: CLINIC | Age: 33
End: 2023-07-05

## 2023-07-05 ENCOUNTER — TELEPHONE (OUTPATIENT)
Dept: SURGERY | Facility: CLINIC | Age: 33
End: 2023-07-05

## 2023-07-05 ENCOUNTER — TELEPHONE (OUTPATIENT)
Dept: ORTHOPEDICS CLINIC | Facility: CLINIC | Age: 33
End: 2023-07-05

## 2023-07-05 DIAGNOSIS — M25.532 LEFT WRIST PAIN: Primary | ICD-10-CM

## 2023-07-05 LAB
ATRIAL RATE: 83 BPM
P AXIS: 61 DEGREES
P-R INTERVAL: 154 MS
Q-T INTERVAL: 332 MS
QRS DURATION: 84 MS
QTC CALCULATION (BEZET): 390 MS
R AXIS: 52 DEGREES
T AXIS: 41 DEGREES
VENTRICULAR RATE: 83 BPM

## 2023-07-05 NOTE — TELEPHONE ENCOUNTER
Spoke with Elmo May no current openings on her schedule for 7/7/23 recommended appointment opening for tomorrow 7/6/23 with Dr. Guzman Larson. Appt scheduled for 7/6/23 at [address] [place] [address] with wife Taty King.

## 2023-07-05 NOTE — TELEPHONE ENCOUNTER
When is pt able to be seen and do you want repeat imaging for that appt? Follow up xray rec in 7-10 days. Thank you! 7/4/23:  FINDINGS:  There is a subtle lucency through the mid aspect of the left scaphoid bone which may represent overlying bony trabeculae or a nondisplaced fracture. Follow-up radiographs of the left wrist in 7-10 days is recommended for further assessment.

## 2023-07-05 NOTE — TELEPHONE ENCOUNTER
Wife calling to make an appt for spouse, after an ER visit. Patient had xray completed: There is a subtle lucency through the mid aspect of the left scaphoid bone which may represent overlying bony trabeculae or a nondisplaced fracture. Please advise if / when to schedule appt. Patients wife, Yareli Lyons, would like a callback at: [phone]. Thanks!
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
## 2023-07-05 NOTE — TELEPHONE ENCOUNTER
Please check to see if [facility] is willing to seen next week, otherwise patient needs to be referred out.
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
## 2023-07-06 ENCOUNTER — OFFICE VISIT (OUTPATIENT)
Dept: FAMILY MEDICINE CLINIC | Facility: CLINIC | Age: 33
End: 2023-07-06
Payer: COMMERCIAL

## 2023-07-06 VITALS
BODY MASS INDEX: 23.59 KG/M2 | HEIGHT: 72.64 IN | TEMPERATURE: 98 F | DIASTOLIC BLOOD PRESSURE: 60 MMHG | WEIGHT: 178 LBS | SYSTOLIC BLOOD PRESSURE: 100 MMHG | OXYGEN SATURATION: 97 % | HEART RATE: 96 BPM
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
## 2023-07-06 DIAGNOSIS — T07.XXXA ABRASIONS OF MULTIPLE SITES: ICD-10-CM

## 2023-07-06 PROCEDURE — 99214 OFFICE O/P EST MOD 30 MIN: CPT | Performed by: FAMILY MEDICINE

## 2023-07-06 PROCEDURE — 3074F SYST BP LT 130 MM HG: CPT | Performed by: FAMILY MEDICINE

## 2023-07-06 PROCEDURE — 3008F BODY MASS INDEX DOCD: CPT | Performed by: FAMILY MEDICINE

## 2023-07-06 PROCEDURE — 3078F DIAST BP <80 MM HG: CPT | Performed by: FAMILY MEDICINE

## 2023-07-06 NOTE — TELEPHONE ENCOUNTER
Patient returning call, patient has scheduled w/ [facility] for tomorrow and was advised to arrived 20 mins prior to complete xray, orders to be placed, please be advised.     Future Appointments   Date Time Provider Leif Pateli   7/7/2023 10:40 AM KEYON Sandoval [facility] [id number]   7/11/2023  2:30 PM Ananya Rhodes MD [facility] [place]   7/12/2023 10:00 AM Nithin Fernandez PA-C [facility] EC Nap 4   8/16/2023  3:00 PM Que Miller PA-C [facility] [place]

## 2023-07-06 NOTE — TELEPHONE ENCOUNTER
Dr Odessia Burkitt has no availability. Please offer sincer. Will need repeat imaging if seen tomorrow or later. Let us know if schedules and we will place order. Thank you!

## 2023-07-06 NOTE — TELEPHONE ENCOUNTER
XR done.     Future Appointments   Date Time Provider Leif Wyatt   7/7/2023 10:20 AM [facility] X[facility]   7/7/2023 10:40 AM KEYON Palmer EMG Tressia Phlegm [id number]   7/11/2023  2:30 PM Viola Brasher MD [facility] [place]   7/12/2023 10:00 AM Pati Koyanagi, PA-C [facility] EC Nap 4   8/16/2023  3:00 PM Pedro Pablo Rashid PA-C [facility] [place]

## 2023-07-06 NOTE — PATIENT INSTRUCTIONS
Ortho should be calling today for appt    Dressing change with small amount of triple antibiotic daily    Leave open to air for 2-3 hrs daily    Continue naproxen 2x/day with food for next week, then as needed    Norco as needed nightly for more severe pain     See me on Tuesday for followup at 230pm

## 2023-07-07 ENCOUNTER — OFFICE VISIT (OUTPATIENT)
Dept: ORTHOPEDICS CLINIC | Facility: CLINIC | Age: 33
End: 2023-07-07
Payer: COMMERCIAL

## 2023-07-07 ENCOUNTER — HOSPITAL ENCOUNTER (OUTPATIENT)
Dept: GENERAL RADIOLOGY | Age: 33
Discharge: HOME | End: 2023-07-07
Attending: ORTHOPAEDIC SURGERY
Payer: COMMERCIAL

## 2023-07-07 VITALS — HEIGHT: 72 IN | WEIGHT: 176 LBS | BODY MASS INDEX: 23.84 KG/M2

## 2023-07-07 DIAGNOSIS — S62.002A CLOSED NONDISPLACED FRACTURE OF SCAPHOID OF LEFT WRIST, UNSPECIFIED PORTION OF SCAPHOID, INITIAL ENCOUNTER: Primary | ICD-10-CM

## 2023-07-07 DIAGNOSIS — M25.532 LEFT WRIST PAIN: ICD-10-CM

## 2023-07-07 PROCEDURE — 99213 OFFICE O/P EST LOW 20 MIN: CPT | Performed by: PHYSICIAN ASSISTANT

## 2023-07-07 PROCEDURE — 73110 X-RAY EXAM OF WRIST: CPT | Performed by: ORTHOPAEDIC SURGERY

## 2023-07-07 PROCEDURE — 3008F BODY MASS INDEX DOCD: CPT | Performed by: PHYSICIAN ASSISTANT

## 2023-07-10 DIAGNOSIS — F90.0 ATTENTION DEFICIT HYPERACTIVITY DISORDER (ADHD), PREDOMINANTLY INATTENTIVE TYPE: ICD-10-CM

## 2023-07-10 RX ORDER — DEXMETHYLPHENIDATE HYDROCHLORIDE 15 MG/1
15 CAPSULE, EXTENDED RELEASE ORAL DAILY
Qty: 30 CAPSULE | Refills: 0 | Status: SHIPPED | OUTPATIENT
Start: 2023-07-16 | End: 2023-08-15

## 2023-07-10 NOTE — TELEPHONE ENCOUNTER
Requested Prescriptions     Pending Prescriptions Disp Refills    Dexmethylphenidate HCl ER (FOCALIN XR) 15 MG Oral Capsule SR 24 Hr 30 capsule 0     Sig: Take 1 capsule (15 mg total) by mouth daily. Refill requested above pended for refill on 7/16/23 to [facility] of [facility]. Please sign if you approve.

## 2023-07-10 NOTE — TELEPHONE ENCOUNTER
Pt requesting refill on Dexmethylphenidate HCl ER (FOCALIN XR) 15 MG  [facility] on file does not have   Would like sent to [facility] on file   Please advise

## 2023-07-11 ENCOUNTER — OFFICE VISIT (OUTPATIENT)
Dept: FAMILY MEDICINE CLINIC | Facility: CLINIC | Age: 33
End: 2023-07-11
Payer: COMMERCIAL

## 2023-07-11 ENCOUNTER — HOSPITAL ENCOUNTER (OUTPATIENT)
Dept: ULTRASOUND IMAGING | Age: 33
Discharge: HOME | End: 2023-07-11
Attending: FAMILY MEDICINE
Payer: COMMERCIAL

## 2023-07-11 VITALS
OXYGEN SATURATION: 98 % | SYSTOLIC BLOOD PRESSURE: 120 MMHG | HEIGHT: 72 IN | WEIGHT: 181 LBS | HEART RATE: 102 BPM | DIASTOLIC BLOOD PRESSURE: 70 MMHG | BODY MASS INDEX: 24.52 KG/M2 | TEMPERATURE: 98 F

## 2023-07-11 DIAGNOSIS — S89.92XA INJURY OF LEFT LOWER EXTREMITY, INITIAL ENCOUNTER: ICD-10-CM

## 2023-07-11 DIAGNOSIS — F90.0 ATTENTION DEFICIT HYPERACTIVITY DISORDER (ADHD), PREDOMINANTLY INATTENTIVE TYPE: ICD-10-CM

## 2023-07-11 DIAGNOSIS — M79.89 LEFT LEG SWELLING: ICD-10-CM
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
## 2023-07-11 DIAGNOSIS — T07.XXXA ABRASIONS OF MULTIPLE SITES: ICD-10-CM

## 2023-07-11 PROCEDURE — 99214 OFFICE O/P EST MOD 30 MIN: CPT | Performed by: FAMILY MEDICINE

## 2023-07-11 PROCEDURE — 3078F DIAST BP <80 MM HG: CPT | Performed by: FAMILY MEDICINE

## 2023-07-11 PROCEDURE — 3074F SYST BP LT 130 MM HG: CPT | Performed by: FAMILY MEDICINE

## 2023-07-11 PROCEDURE — 93971 EXTREMITY STUDY: CPT | Performed by: FAMILY MEDICINE

## 2023-07-11 PROCEDURE — 3008F BODY MASS INDEX DOCD: CPT | Performed by: FAMILY MEDICINE

## 2023-07-11 RX ORDER — CEPHALEXIN 500 MG/1
500 CAPSULE ORAL 3 TIMES DAILY
Qty: 21 CAPSULE | Refills: 0 | Status: SHIPPED | OUTPATIENT
Start: 2023-07-11

## 2023-07-11 RX ORDER — DEXMETHYLPHENIDATE HYDROCHLORIDE 15 MG/1
15 CAPSULE, EXTENDED RELEASE ORAL DAILY
Qty: 30 CAPSULE | Refills: 0 | Status: SHIPPED | OUTPATIENT
Start: 2023-07-16 | End: 2023-08-15

## 2023-07-11 NOTE — PATIENT INSTRUCTIONS
Start antibiotics as prescribed    Call to schedule ultrasound of leg    Followup next week as planned

## 2023-07-12 ENCOUNTER — OFFICE VISIT (OUTPATIENT)
Dept: SURGERY | Facility: CLINIC | Age: 33
End: 2023-07-12

## 2023-07-12 DIAGNOSIS — S30.22XA HEMATOMA OF SCROTUM: Primary | ICD-10-CM

## 2023-07-12 DIAGNOSIS — R82.90 URINE FINDING: ICD-10-CM

## 2023-07-12 LAB
APPEARANCE: CLEAR
BILIRUBIN: NEGATIVE
GLUCOSE (URINE DIPSTICK): NEGATIVE MG/DL
KETONES (URINE DIPSTICK): NEGATIVE MG/DL
LEUKOCYTES: NEGATIVE
MULTISTIX LOT#: NORMAL NUMERIC
NITRITE, URINE: NEGATIVE
OCCULT BLOOD: NEGATIVE
PH, URINE: 5.5 (ref 4.5–8)
PROTEIN (URINE DIPSTICK): NEGATIVE MG/DL
SPECIFIC GRAVITY: 1.02 (ref 1–1.03)
URINE-COLOR: YELLOW
UROBILINOGEN,SEMI-QN: 0.2 MG/DL (ref 0–1.9)

## 2023-07-12 PROCEDURE — 81003 URINALYSIS AUTO W/O SCOPE: CPT

## 2023-07-12 PROCEDURE — 99203 OFFICE O/P NEW LOW 30 MIN: CPT

## 2023-07-12 RX ORDER — DEXMETHYLPHENIDATE HYDROCHLORIDE 15 MG/1
15 CAPSULE, EXTENDED RELEASE ORAL DAILY
Qty: 30 CAPSULE | Refills: 0 | OUTPATIENT
Start: 2023-07-12 | End: 2023-08-11
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
## 2023-08-15 DIAGNOSIS — G43.109 MIGRAINE WITH AURA AND WITHOUT STATUS MIGRAINOSUS, NOT INTRACTABLE: ICD-10-CM

## 2023-08-15 RX ORDER — PROPRANOLOL HCL 60 MG
60 CAPSULE, EXTENDED RELEASE 24HR ORAL DAILY
Qty: 30 CAPSULE | Refills: 0 | Status: SHIPPED | OUTPATIENT
Start: 2023-08-15 | End: 2023-08-16

## 2023-09-24 DIAGNOSIS — K21.9 GASTROESOPHAGEAL REFLUX DISEASE WITHOUT ESOPHAGITIS: ICD-10-CM

## 2023-09-24 RX ORDER — FAMOTIDINE 40 MG/1
40 TABLET, FILM COATED ORAL DAILY
Qty: 90 TABLET | Refills: 1 | Status: SHIPPED | OUTPATIENT
Start: 2023-09-24

## 2023-10-02 DIAGNOSIS — F90.0 ATTENTION DEFICIT HYPERACTIVITY DISORDER (ADHD), PREDOMINANTLY INATTENTIVE TYPE: ICD-10-CM

## 2023-10-02 RX ORDER — DEXMETHYLPHENIDATE HYDROCHLORIDE 15 MG/1
15 CAPSULE, EXTENDED RELEASE ORAL DAILY
Qty: 30 CAPSULE | Refills: 0 | Status: CANCELLED | OUTPATIENT
Start: 2023-10-02 | End: 2023-11-01

## 2023-10-03 RX ORDER — DEXMETHYLPHENIDATE HYDROCHLORIDE 15 MG/1
15 CAPSULE, EXTENDED RELEASE ORAL DAILY
Qty: 30 CAPSULE | Refills: 0 | Status: SHIPPED | OUTPATIENT
Start: 2023-10-03 | End: 2023-11-02

## 2023-10-03 NOTE — TELEPHONE ENCOUNTER
Medication(s) to Refill:   Requested Prescriptions     Pending Prescriptions Disp Refills    Dexmethylphenidate HCl ER (FOCALIN XR) 15 MG Oral Capsule SR 24 Hr 30 capsule 0     Sig: Take 1 capsule (15 mg total) by mouth daily.      Last Time Medication was Filled:  8/17/23    Recent Visits  Date Type Provider Dept   08/16/23 Office Visit Edy Rosales PA-C [facility]     Future Appointments  No visits were found

## 2023-11-28 ENCOUNTER — PATIENT MESSAGE (OUTPATIENT)
Dept: FAMILY MEDICINE CLINIC | Facility: CLINIC | Age: 33
End: 2023-11-28

## 2023-11-28 NOTE — TELEPHONE ENCOUNTER
Naseem Joseph,     Just a friendly reminder you are due for your yearly physical and medication follow up. If you would like to schedule an appointment let us know via my chart or via phone @ [phone]. If you have any additional questions or concerns please let us know via my chart or phone.         Thank you &  Happy [facility]   Gomez Sample

## 2024-11-30 ENCOUNTER — HOSPITAL ENCOUNTER (EMERGENCY)
Age: 34
Discharge: HOME | End: 2024-11-30
Payer: COMMERCIAL

## 2024-11-30 ENCOUNTER — APPOINTMENT (OUTPATIENT)
Dept: GENERAL RADIOLOGY | Age: 34
End: 2024-11-30
Attending: NURSE PRACTITIONER
Payer: COMMERCIAL

## 2024-11-30 VITALS
SYSTOLIC BLOOD PRESSURE: 110 MMHG | HEIGHT: 72 IN | WEIGHT: 180 LBS | DIASTOLIC BLOOD PRESSURE: 68 MMHG | RESPIRATION RATE: 16 BRPM | HEART RATE: 60 BPM | BODY MASS INDEX: 24.38 KG/M2 | TEMPERATURE: 97 F | OXYGEN SATURATION: 98 %

## 2024-11-30 DIAGNOSIS — S39.012A STRAIN OF LUMBAR REGION, INITIAL ENCOUNTER: Primary | ICD-10-CM

## 2024-11-30 DIAGNOSIS — M62.830 SPASM OF LUMBAR PARASPINOUS MUSCLE: ICD-10-CM

## 2024-11-30 PROCEDURE — 72110 X-RAY EXAM L-2 SPINE 4/>VWS: CPT | Performed by: NURSE PRACTITIONER

## 2024-11-30 PROCEDURE — 99283 EMERGENCY DEPT VISIT LOW MDM: CPT

## 2024-11-30 PROCEDURE — 99284 EMERGENCY DEPT VISIT MOD MDM: CPT

## 2024-11-30 PROCEDURE — 96372 THER/PROPH/DIAG INJ SC/IM: CPT

## 2024-11-30 RX ORDER — KETOROLAC TROMETHAMINE 30 MG/ML
30 INJECTION, SOLUTION INTRAMUSCULAR; INTRAVENOUS ONCE
Status: COMPLETED | OUTPATIENT
Start: 2024-11-30 | End: 2024-11-30

## 2024-11-30 NOTE — DISCHARGE INSTRUCTIONS
Over-the-counter Tylenol/Motrin as needed for pain.  Use the tizanidine muscle relaxer pain not relieved by Motrin/Tylenol.  Change positions slowly.  Gentle massages.  I also recommend a heating pad to the affected region of the back 10 minutes at a time, 3-4 times a day until resolution of symptoms.    Call your primary care doctor to arrange a follow-up appointment.    Return to the emergency department for new or worsening symptoms, including loss of bowel or bladder control, vomiting, vision changes, severe pain.

## 2024-11-30 NOTE — ED PROVIDER NOTES
Patient Seen in: [facility] Emergency Department In [place]      History     Chief Complaint   Patient presents with    Back Pain     Stated Complaint: low back pain last noc    Subjective:   34-year-old male, who yesterday picked up both of his children and 1 on each arm from the ground.  States when he picked him up and stood up he felt a pop.  Has had pain to the middle of the lower back since.  No loss of bowel or bladder control.  States he did take 400 mg of Advil this morning at 8 AM which he told me did not help much.  He drove himself here.              Objective:     Past Medical History:    ADHD (attention deficit hyperactivity disorder)    Migraines    Moderate major depression (HCC)    Palpitations              Past Surgical History:   Procedure Laterality Date    Appendectomy  2015    At [facility], laparoscopic    Plastic surgery, neck  01/01/2008                Social History     Socioeconomic History    Marital status:    Tobacco Use    Smoking status: Never    Smokeless tobacco: Never   Vaping Use    Vaping status: Never Used   Substance and Sexual Activity    Alcohol use: Yes     Comment: 1 drink weekly    Drug use: Yes     Frequency: 7.0 times per week     Types: Cannabis     Comment: edibles weed weekly    Sexual activity: Yes     Partners: Female   Other Topics Concern    Caffeine Concern Yes     Comment: 1 cup of coffee daily and 3 red bulls daily    Exercise No    Seat Belt Yes    Special Diet No    Stress Concern Yes     Service No    Hobby Hazards Yes    Sleep Concern Yes    Bike Helmet Yes                  Physical Exam     ED Triage Vitals [11/30/24 1032]   /88   Pulse 86   Resp 16   Temp 97.4 °F (36.3 °C)   Temp src Temporal   SpO2 96 %   O2 Device None (Room air)       Current Vitals:   Vital Signs  BP: 110/68  Pulse: 60  Resp: 16  Temp: 97.4 °F (36.3 °C)  Temp src: Temporal    Oxygen Therapy  SpO2: 98 %  O2 Device: None (Room air)        Physical  Exam  Vitals and nursing note reviewed.   Constitutional:       General: He is in acute distress.      Appearance: He is not ill-appearing, toxic-appearing or diaphoretic.   Pulmonary:      Effort: Pulmonary effort is normal. No respiratory distress.   Musculoskeletal:      Thoracic back: Normal.      Lumbar back: Spasms, tenderness and bony tenderness present. No swelling, edema or deformity. Normal range of motion.   Skin:     General: Skin is warm and dry.      Capillary Refill: Capillary refill takes less than 2 seconds.      Coloration: Skin is not jaundiced or pale.      Findings: No bruising, lesion or rash.   Neurological:      General: No focal deficit present.      Mental Status: He is alert and oriented to person, place, and time.   Psychiatric:         Mood and Affect: Mood normal.         Behavior: Behavior normal.             ED Course   Labs Reviewed - No data to display  XR LUMBAR SPINE (MIN 4 VIEWS) (CPT=72110)    Result Date: 11/30/2024  PROCEDURE:  XR LUMBAR SPINE (MIN 4 VIEWS) (CPT=72110)  TECHNIQUE:  AP, lateral, oblique, and coned down L5-S1 views were obtained.  COMPARISON:  None.  INDICATIONS:  low back pain last noc  PATIENT STATED HISTORY: (As transcribed by Technologist)  Midline lumbar spine pain. Injured yesterday while \"rough-housing/playing\" with his sons.    FINDINGS:    BONES:  Normal.  No significant spondylosis, scoliosis, fracture, or visible bony lesion. DISC SPACES:  Normal.  No significant disc height narrowing, subluxation, or endplate abnormality. PARASPINOUS:  Negative.  No paraspinous abnormality is seen. OTHER:  Negative.             CONCLUSION:  No acute disease.    LOCATION:  [facility]   Dictated by (CST): Romaine Spears MD on 11/30/2024 at 12:35 PM     Finalized by (CST): Romaine Spears MD on 11/30/2024 at 12:36 PM                    [facility]              Medical Decision Making  Differential diagnosis initially included but was not limited to: Lumbar strain, disc injury    Adult  male patient with pain to the lower back region.  No neurovascular deficits.  No saddle anesthesia.    I personally viewed, independently interpreted and radiology report was reviewed.  No acute fracture.    Likely strain.    OTC Motrin Tylenol.  Will also prescribe Zanaflex.  Side effects discussed.    Supportive/home management of diagnosis/illness/injury discussed. Red flag symptoms discussed.  Signs and symptoms/criteria that would necessitate reevaluation, including ER evaluation discussed.  Patient and/or responsible adult verbalize and agree with management and plan of care.    Speech recognition software was used during this dictation.  There may be minor errors in transcription.          Amount and/or Complexity of Data Reviewed  Radiology: ordered and independent interpretation performed. Decision-making details documented in ED Course.  ECG/medicine tests: ordered. Decision-making details documented in ED Course.    Risk  OTC drugs.  Prescription drug management.        Disposition and Plan     Clinical Impression:  1. Strain of lumbar region, initial encounter    2. Spasm of lumbar paraspinous muscle         Disposition:  Discharge  11/30/2024 12:59 pm    Follow-up:  Lisa Lopez DO  [address] [address]  [facility] [address] [phone]    Call in 2 day(s)      [facility] [place]  [address] [facility] [address]  [phone]  Go to  As needed, If symptoms worsen          Medications Prescribed:  Current Discharge Medication List        START taking these medications    Details   tiZANidine 4 MG Oral Tab Take 1 tablet (4 mg total) by mouth every 6 (six) hours as needed (pain/muscle spasm).  Qty: 15 tablet, Refills: 0                 Supplementary Documentation:

## 2024-11-30 NOTE — ED INITIAL ASSESSMENT (HPI)
Bent down last night and picked up two boys (6 and 9) under each arm and felt a pop in low back with pain- limited movement with increased pain today

## 2024-12-04 ENCOUNTER — OFFICE VISIT (OUTPATIENT)
Dept: FAMILY MEDICINE CLINIC | Facility: CLINIC | Age: 34
End: 2024-12-04
Payer: COMMERCIAL

## 2024-12-04 ENCOUNTER — TELEPHONE (OUTPATIENT)
Dept: FAMILY MEDICINE CLINIC | Facility: CLINIC | Age: 34
End: 2024-12-04

## 2024-12-04 VITALS
HEART RATE: 66 BPM | RESPIRATION RATE: 16 BRPM | TEMPERATURE: 98 F | DIASTOLIC BLOOD PRESSURE: 76 MMHG | HEIGHT: 72 IN | OXYGEN SATURATION: 98 % | BODY MASS INDEX: 24 KG/M2 | SYSTOLIC BLOOD PRESSURE: 136 MMHG

## 2024-12-04 DIAGNOSIS — Z79.899 MEDICATION MANAGEMENT: ICD-10-CM

## 2024-12-04 DIAGNOSIS — F41.1 GAD (GENERALIZED ANXIETY DISORDER): ICD-10-CM

## 2024-12-04 DIAGNOSIS — Z28.21 REFUSED INFLUENZA VACCINE: ICD-10-CM

## 2024-12-04 DIAGNOSIS — M54.42 ACUTE LEFT-SIDED LOW BACK PAIN WITH LEFT-SIDED SCIATICA: Primary | ICD-10-CM

## 2024-12-04 PROCEDURE — 99214 OFFICE O/P EST MOD 30 MIN: CPT | Performed by: FAMILY MEDICINE

## 2024-12-04 RX ORDER — METHYLPREDNISOLONE 4 MG/1
TABLET ORAL
Qty: 1 EACH | Refills: 0 | Status: SHIPPED | OUTPATIENT
Start: 2024-12-04

## 2024-12-04 NOTE — TELEPHONE ENCOUNTER
Patient was seen at [place] ER yesterday and is still in a lot of pain - he was lifting his child and strained his back. Pain is now radiating to his hips. ER wanted him to follow up with PCP as well    Please advise.

## 2024-12-04 NOTE — TELEPHONE ENCOUNTER
Spoke with patient. On 11/29 he  his kids at the same time and he felt a \"pop\" in his lower back. The pain is at his belt line in middle of back and radiates to hip and legs. He is unable to find a comfortable posistion. He went to ED 11/30. Xray of lumbar negative. Patient was sent home with muscle relaxer. He has been using heat, tylenol, and advil with some relief. He stated his pain has become worse the last couple of days with working. At rest patient's pain is at a 5. He is concerned that the pain has become increased and would like to be seen for ED follow-up with Angy Peterson PA-C.   Please review and advise

## 2024-12-04 NOTE — PROGRESS NOTES
HPI:   Haris Wilhelm is a 34 year old male who is here for complaints of back pain follow-up on anxiety/depression.    ----Lower back pain  Follow-up emergency department visit from 11/30/2024 given Toradol was put on Flexeril minimal relief  X-ray lumbar spine normal  Pain is located at low back, back & leg.   Pain is described as aching, sharp, shooting. Severity is severe.   The pain radiates to left leg to the knee  Pain was precipitated by lifting his 70 pound child on Friday night felt a popping sensation in his back and then experienced shooting pain with electrical shock  Has had for 5  days and has not improved.   Pain is worsened by bending, twisting, dressing, walking, lifting.   Gets relief of back pain with lying down.   Prior back pain hx: no prior back problems.   Has no numbness, tingling, or abdominal pain.  No change in urination or bowel movements.  Works as a  management needs a note to avoid bending lifting and twisting while he gets physical therapy.  Did get some improvement with some back stretches.      --- Anxiety/depression  Was off of fluoxetine secondary to insurance issues wants to go back on it had been taking 40 mg during his divorce but is feeling better than he did when he first started the fluoxetine.  Does want to go back on the fluoxetine  Is presently working and happier with his job had been in a job that was difficult  He denies any suicidal ideations does feel that the fluoxetine and depression symptoms are much better when he was on fluoxetine.  Headaches are less is not on the Inderal presently does state that he would be okay with holding on Inderal since the headaches were more related to stress.  BRANT 7 is at a 12 very difficult  PHQ-9 is at a 14 somewhat difficult   12/4/2024   [facility] AMB BRANT-7    Feeling nervous, anxious, or on edge 1    Not being able to stop or control worrying 1    Worrying too much about different things    2    Trouble relaxing 2    Being so  restless that it's hard to sit still 2    Becoming easily annoyed or irritable 2    Feeling afraid as if something awful might happen 2    BRANT 7 Total Score 12    If you checked off any problems, how difficult have these made it for you to do your work, take care of things at home, or get along with other people? Very difficult      [place] Suicide Severity Rating Scale Screener   In what setting is the screener performed?: an office visit  1. Have you wished you were dead or wished you could go to sleep and not wake up? (past 30 days): Yes  2. Have you actually had any thoughts of killing yourself? (past 30 days): No  6. Have you ever done anything, started to do anything, or prepared to do anything to end your life? (lifetime): No  Score and Suggested Actions - Office Visit: 1- Low Risk:  Encourage the patient to initiate behavioral health services with a [facility] Navigation Order or BHI order.  Educate patient to call [facility] at [phone] if symptoms worsen.  Score and Intervention  Score and Suggested Actions - Office Visit: 1- Low Risk:  Encourage the patient to initiate behavioral health services with a [facility] Navigation Order or BHI order.  Educate patient to call [facility] at [phone] if symptoms worsen.    1. Little interest or pleasure in doing things: Several days  2. Feeling down, depressed, or hopeless: More than half the days  3. Trouble falling or staying asleep, or sleeping too much: Nearly every day  4. Feeling tired or having little energy: Not at all  5. Poor appetite or overeating: Several days  6. Feeling bad about yourself - or that you are a failure or have let yourself or your family down: Several days  7. Trouble concentrating on things, such as reading the newspaper or watching television: More than half the days  8. Moving or speaking so slowly that other people could have noticed. Or the opposite - being so fidgety or restless that you have been moving around a lot  more than usual: Nearly every day  9. Thoughts that you would be better off dead, or of hurting yourself in some way: Several days  PHQ-9 TOTAL SCORE: 14  If you checked off any problems, how difficult have these problems made it for you to do your work, take care of things at home, or get along with other people?: Somewhat difficult     Current Outpatient Medications   Medication Sig Dispense Refill    methylPREDNISolone (MEDROL) 4 MG Oral Tablet Therapy Pack As directed. 1 each 0    FLUoxetine 20 MG Oral Cap Take 1 capsule (20 mg total) by mouth daily. 90 capsule 0    tiZANidine 4 MG Oral Tab Take 1 tablet (4 mg total) by mouth every 6 (six) hours as needed (pain/muscle spasm). (Patient not taking: Reported on 12/4/2024) 15 tablet 0    famotidine 40 MG Oral Tab Take 1 tablet (40 mg total) by mouth daily. (Patient not taking: Reported on 12/4/2024) 90 tablet 1    Propranolol HCl ER 60 MG Oral Capsule SR 24 Hr Take 1 capsule (60 mg total) by mouth daily. (Patient not taking: Reported on 12/4/2024) 90 capsule 1     Past Medical History:    ADHD (attention deficit hyperactivity disorder)    Migraines    Moderate major depression (HCC)    Palpitations       Past Surgical History:   Procedure Laterality Date    Appendectomy  2015    At [facility], laparoscopic    Plastic surgery, neck  01/01/2008       Family History   Problem Relation Age of Onset    Macular degeneration Mother     Arthritis Mother     Dementia Father     Other (valve) Father     Other (dementia) Father     Migraines Sister     Juvenile Rheumatoid Arthritis Sister     Migraines Brother     Other (Scoliosis) Maternal Grandmother     Dementia Paternal Grandfather      SOCIAL HISTORY:  Social History     Socioeconomic History    Marital status:    Tobacco Use    Smoking status: Never    Smokeless tobacco: Never   Vaping Use    Vaping status: Never Used   Substance and Sexual Activity    Alcohol use: Yes     Comment: 1 drink weekly     Drug use: Yes     Frequency: 7.0 times per week     Types: Cannabis     Comment: edibles weed weekly    Sexual activity: Yes     Partners: Female   Other Topics Concern    Caffeine Concern Yes     Comment: 1 cup of coffee daily and 3 red bulls daily    Exercise No    Seat Belt Yes    Special Diet No    Stress Concern Yes    Weight Concern No     Service No    Occupational Exposure No    Hobby Hazards Yes    Sleep Concern Yes    Back Care No    Bike Helmet Yes    Self-Exams No      Occupation: .  Exercise: minimal.  Diet: watches minimally    REVIEW OF SYSTEMS:   GENERAL: feels well otherwise  SKIN: denies any unusual skin lesions  LUNGS: denies shortness of breath with exertion  CARDIOVASCULAR: denies chest pain on exertion  GI: denies abdominal pain,denies heartburn  : no dysuria, urgency or flank pain.  MUSCULOSKELETAL: no other joints are affected  Psych see above  EXAM:   /76 (BP Location: Left arm, Patient Position: Sitting, Cuff Size: adult)   Pulse 66   Temp 97.7 °F (36.5 °C) (Temporal)   Resp 16   Ht 6' (1.829 m)   SpO2 98%   BMI 24.41 kg/m²    GENERAL: well developed, well nourished,in no apparent distress  SKIN: no rashes,no suspicious lesions  NECK: supple,no adenopathy,no bruits  LUNGS: clear to auscultation  CARDIO: RRR without murmur  GI: normoactive bs x4, no masses, HSM or tenderness  EXTREMITIES: no cyanosis, clubbing or edema  BACK: lumbosacral tenderness at the L5-S1 left greater than right, is able to flex 50 to 60 degrees degrees, DTR's are 2+ bilaterally, strength is 5+/5, sensation is intact, straight leg raise is positive on the left, heel walk difficult on the left normal toe walk bilateral  NEURO: 2+achilles and patellar DTR bilaterally  Psych patient's mood is positive with anxiety his affect is normal communication skills are good does have some underlying anxiety    ASSESSMENT:   Haris KENDRA Wilhelm is a 34 year old male who presents with complaints of back pain.  Findings are C/W Lumbar Radiculopathy, Sciatica.      PLAN:     Encounter Diagnoses   Name Primary?    Acute left-sided low back pain with left-sided sciatica Yes    BRANT (generalized anxiety disorder)     Medication management     Refused influenza vaccine        Orders Placed This Encounter   Procedures    Influenza Refused       Meds & Refills for this Visit:  Requested Prescriptions     Signed Prescriptions Disp Refills    methylPREDNISolone (MEDROL) 4 MG Oral Tablet Therapy Pack 1 each 0     Sig: As directed.    FLUoxetine 20 MG Oral Cap 90 capsule 0     Sig: Take 1 capsule (20 mg total) by mouth daily.       Imaging & Consults:  INFLUENZA REFUSED [facility]  OP REFERRAL TO [facility]  1. Acute left-sided low back pain with left-sided sciatica  Rest, ice, heat, physical therapy, no lifting, pushing or pulling, stretching exercises discussed with patient and written info given  The patient indicates understanding of these issues and agrees to the plan.  The patient is asked to return if sx's persist or worsen.  Back exercises reviewed.  Medication reviewed  provided risks, benefits and side effects of medication.  Lidocaine patch  - methylPREDNISolone (MEDROL) 4 MG Oral Tablet Therapy Pack; As directed.  Dispense: 1 each; Refill: 0  - Physical Therapy Referral - [facility]    2. BRANT (generalized anxiety disorder)  Reviewed medication benefits and side effects.   Discussed if any problems call office immediately especially if gets increased depression, anxiety or any homicidal or suicidal symptoms.   Restart fluoxetine at 20 mg make follow-up office appointment for 4 to 6 weeks  - FLUoxetine 20 MG Oral Cap; Take 1 capsule (20 mg total) by mouth daily.  Dispense: 90 capsule; Refill: 0    3. Medication management  As above    4. Refused influenza vaccine  - Influenza Refused  Time spent was 35 minutes more than 50% was spent on counseling regarding medical conditions and treatment.  Rest of time  was spent reviewing chart, reviewing blood work and radiology tests.   Provider patient relationship has had a longitudinal long term relationship to address and treat complex conditions.    Appointment scheduled for 1/15/2025

## 2025-01-09 ENCOUNTER — OFFICE VISIT (OUTPATIENT)
Dept: PHYSICAL THERAPY | Age: 35
End: 2025-01-09
Attending: FAMILY MEDICINE
Payer: COMMERCIAL

## 2025-01-09 ENCOUNTER — TELEPHONE (OUTPATIENT)
Dept: PHYSICAL THERAPY | Facility: HOSPITAL | Age: 35
End: 2025-01-09

## 2025-01-09 DIAGNOSIS — M54.42 ACUTE LEFT-SIDED LOW BACK PAIN WITH LEFT-SIDED SCIATICA: Primary | ICD-10-CM

## 2025-01-09 PROCEDURE — 97162 PT EVAL MOD COMPLEX 30 MIN: CPT

## 2025-01-09 PROCEDURE — 97110 THERAPEUTIC EXERCISES: CPT

## 2025-01-09 NOTE — PROGRESS NOTES
SPINE EVALUATION:     Diagnosis:   Acute left-sided low back pain with left-sided sciatica (M54.42)         Referring Provider: Angy Peterson PA-C Date of Evaluation:    1/9/2025    Precautions:  None Next MD visit:   none scheduled  Date of Surgery: n/a       PATIENT SUMMARY   Haris Wilhelm is a 34 year old male who presents to therapy today with complaints of  left sided back pain that radiates to LLE to foot.This started from an episode of lifting 2 children that he suddenly felt that shooting back pain to LE.It happened over a month ago that he had to go to ER.He was given pain medication an muscle relaxant and has been feeling better since then but it he continues to have pain when more activities.The patient w  works as a  that entails bending lifting and twisting.He has been back to work but avoid any heavy lifting, and he has started to do stretching on his own with some relief.         Recent imaging performed: 11/30/3034 Lumbar Xray.  FINDINGS:      BONES:  Normal.  No significant spondylosis, scoliosis, fracture, or visible bony lesion.   DISC SPACES:  Normal.  No significant disc height narrowing, subluxation, or endplate abnormality.   PARASPINOUS:  Negative.  No paraspinous abnormality is seen.   OTHER:  Negative.       Pt describes pain level at worst 5/10, current 0/10, at best 0/10.     Current functional limitations include avoiding any heavy lifting activities, extended time sitting.  Past medical history was reviewed with Haris. Significant findings include Moderate major depression, Migraines, palpitation, ADHD  Pt denies bowel/bladder changes, saddle anesthesia, and DL LE N/T.    Haris describes prior level of function not limited. Pt goals include to return to PLOF.    ASSESSMENT  Haris presents to physical therapy evaluation with primary c/o Lower back pain that radiates to LLE all the way to the foot. The results of the objective tests and measures show (+) SLR/Slump,  decrease lumbopelvic mobility and decreased flexibility on BLE. Pt responded well to lumbar extension in lying with centralizing symptom.These findings contribute to functional limitations reported in patient summary above.  Signs and symptoms are consistent with diagnosis of lumbar radiculopathy. Pt and PT discussed evaluation findings, pathology, POC and HEP.  Pt voiced understanding and performs HEP correctly without reported pain. Skilled Physical Therapy is medically necessary to address the above impairments and reach functional goals.   Severity > Moderate, Irritabilty >Low to mod ,Nature>Neurogenic , Stage>Acute, Stability >Improving      OBJECTIVE:   Gait: pt ambulates on level ground with normal mechanics  Observation/Posture: loss of lumbar lordosis    Lumbar AROM: (* denotes performed with pain)  Flexion: mod-sig limitation  Extension: mod- sig limit  Sidebending: R mod; L mod limitation  Rotation: R mod  L mod limitation  Rot to R with ext ** , Rot to L with ext no pain        Hip ROM, degrees: (* denotes performed with pain)  WFL hip mobility with end range tightness    Strength: (* denotes performed with pain)  LE   Hip flexion (L2): R 5/5; L 5/5  Knee extension (L3): R 5/5; L 5/5  Knee Flexion: R 5/5; L 5/5  DF (L4): R 5/5; L 5/5  Great Toe Ext (L5): R 5/5, L 5/5  PF (S1): R 5/5; L 5/5   Core strength:  Transversus Abdominus (TA) 3-/5    Flexibility:   LE   Hip Flexor: R mod , L mod tightness  Hamstrings: R mod ; L mod tightness  Piriformis: R mod ; L mod tightness       PIVM (Passive Intervertebral Motion) testing: mod restricted thoracolumbar with pain on Left upper lumbar level  Palpation: (-) TPP on paraspinals  Neuro Screen: WNL to light touch on BLE  Special tests: (+) Slump ,(+) SLR LLE    Oswestry Disability Index Score  Score: (Proxy-Rptd) 16 % (1/9/2025  3:27 PM)      Today’s Treatment and Response:   Pt education was provided on exam findings, treatment diagnosis, treatment plan,  expectations, and prognosis. Pt was also provided recommendations for activity modifications, possible soreness after evaluation, modalities as needed [ice/heat], postural corrections, ergonomics, detrimental fear avoidance behaviors, and importance of remaining active.  Patient was instructed in and issued a HEP for: HS stretches, piriformis stretches, hip flexor stretches, prone press up     Charges: PT Eval Moderate Complexity,       Total Timed Treatment: 45 min     Total Treatment Time: 45 min     Based on clinical rationale and outcome measures, this evaluation involved Moderate Complexity decision making due to 1-2 personal factors/comorbidities, 3 body structures involved/activity limitations, and evolving symptoms including changing pain levels.  PLAN OF CARE:    Goals: (to be met in 8 visits)    Not Met Progress Toward Partially Met Met   Pt will improve transversus abdominis recruitment to perform proper isometric contraction without requiring verbal or tactile cuing to promote advancement of therex. [] [] [] []   Pt will demonstrate good understanding of proper posture and body mechanics to decrease pain and improve spinal safety. [] [] [] []   Pt will improve lumbar spine AROM flexion WNL to allow increase ease with bending forward to don shoes. [] [] [] []   Pt will report improved symptom centralization and absence of radicular symptoms for 3 consecutive days to improve function with ADLs. [] [] [] []   Pt will demonstrate improved core strength to be able to perform med to heavy with <2/10 pain. [] [] [] []   Pt will be independent and compliant with comprehensive HEP to maintain progress achieved in PT [] [] [] []         Frequency / Duration: Patient will be seen for 2 x/week or a total of 8 visits over a 90 day period. Treatment will include: Manual Therapy, Mechanical Traction, Neuromuscular Re-education, Therapeutic Activities, Therapeutic Exercise, and Home Exercise Program instruction,  modalities prn for pain.    Education or treatment limitation: None  Rehab Potential:good    Patient/Family/Caregiver was advised of these findings, precautions, and treatment options and has agreed to actively participate in planning and for this course of care.    Thank you for your referral. Please co-sign or sign and return this letter via fax as soon as possible to [phone]. If you have any questions, please contact me at Dept: [phone]    Sincerely,  Electronically signed by therapist: Aydee Dangelo PT    Physician's certification required: Yes  I certify the need for these services furnished under this plan of treatment and while under my care.    X___________________________________________________ Date____________________    Certification From: 1/9/2025  To:4/9/2025

## 2025-01-13 ENCOUNTER — APPOINTMENT (OUTPATIENT)
Dept: PHYSICAL THERAPY | Age: 35
End: 2025-01-13
Attending: FAMILY MEDICINE
Payer: COMMERCIAL

## 2025-01-13 NOTE — PROGRESS NOTES
Diagnosis:   Acute left-sided low back pain with left-sided sciatica (M54.42)            Referring Provider: Angy Peterson  Date of Evaluation:    ***    Precautions:  {PT_PRECAUTIONS:564::\"None\"} Next MD visit:   none scheduled  Date of Surgery: n/a   Insurance Primary/Secondary: [employer] / N/A     # Auth Visits: ***            Subjective: ***    Pain: ***/10      Objective: ***      Assessment: ***      Goals:   ***    Plan: ***  Date: 1/13/2025  TX#: 2/*** Date:                 TX#: 3/ Date:                 TX#: 4/ Date:                 TX#: 5/ Date:   Tx#: 6/                               HEP: ***    Charges: ***       Total Timed Treatment: *** min  Total Treatment Time: *** min       SPINE EVALUATION:     Diagnosis:   Acute left-sided low back pain with left-sided sciatica (M54.42)         Referring Provider: Angy Peterson PA-C Date of Evaluation:    1/9/2025    Precautions:  None Next MD visit:   none scheduled  Date of Surgery: n/a       PATIENT SUMMARY   Haris Wilhelm is a 34 year old male who presents to therapy today with complaints of  left sided back pain that radiates to LLE to foot.This started from an episode of lifting 2 children that he suddenly felt that shooting back pain to LE.It happened over a month ago that he had to go to ER.He was given pain medication an muscle relaxant and has been feeling better since then but it he continues to have pain when more activities.The patient w  works as a  that entails bending lifting and twisting.He has been back to work but avoid any heavy lifting, and he has started to do stretching on his own with some relief.         Recent imaging performed: 11/30/3034 Lumbar Xray.  FINDINGS:      BONES:  Normal.  No significant spondylosis, scoliosis, fracture, or visible bony lesion.   DISC SPACES:  Normal.  No significant disc height narrowing, subluxation, or endplate abnormality.   PARASPINOUS:  Negative.  No paraspinous abnormality is seen.   OTHER:   Negative.       Pt describes pain level at worst 5/10, current 0/10, at best 0/10.     Current functional limitations include avoiding any heavy lifting activities, extended time sitting.  Past medical history was reviewed with Haris. Significant findings include Moderate major depression, Migraines, palpitation, ADHD  Pt denies bowel/bladder changes, saddle anesthesia, and DL LE N/T.    Haris describes prior level of function not limited. Pt goals include to return to PLOF.    ASSESSMENT  Haris presents to physical therapy evaluation with primary c/o Lower back pain that radiates to LLE all the way to the foot. The results of the objective tests and measures show (+) SLR/Slump, decrease lumbopelvic mobility and decreased flexibility on BLE. Pt responded well to lumbar extension in lying with centralizing symptom.These findings contribute to functional limitations reported in patient summary above.  Signs and symptoms are consistent with diagnosis of lumbar radiculopathy. Pt and PT discussed evaluation findings, pathology, POC and HEP.  Pt voiced understanding and performs HEP correctly without reported pain. Skilled Physical Therapy is medically necessary to address the above impairments and reach functional goals.   Severity > Moderate, Irritabilty >Low to mod ,Nature>Neurogenic , Stage>Acute, Stability >Improving      OBJECTIVE:   Gait: pt ambulates on level ground with normal mechanics  Observation/Posture: loss of lumbar lordosis    Lumbar AROM: (* denotes performed with pain)  Flexion: mod-sig limitation  Extension: mod- sig limit  Sidebending: R mod; L mod limitation  Rotation: R mod  L mod limitation  Rot to R with ext ** , Rot to L with ext no pain        Hip ROM, degrees: (* denotes performed with pain)  WFL hip mobility with end range tightness    Strength: (* denotes performed with pain)  LE   Hip flexion (L2): R 5/5; L 5/5  Knee extension (L3): R 5/5; L 5/5  Knee Flexion: R 5/5; L 5/5  DF (L4): R 5/5;  L 5/5  Great Toe Ext (L5): R 5/5, L 5/5  PF (S1): R 5/5; L 5/5   Core strength:  Transversus Abdominus (TA) 3-/5    Flexibility:   LE   Hip Flexor: R mod , L mod tightness  Hamstrings: R mod ; L mod tightness  Piriformis: R mod ; L mod tightness       PIVM (Passive Intervertebral Motion) testing: mod restricted thoracolumbar with pain on Left upper lumbar level  Palpation: (-) TPP on paraspinals  Neuro Screen: WNL to light touch on BLE  Special tests: (+) Slump ,(+) SLR LLE    Oswestry Disability Index Score  Score: (Proxy-Rptd) 16 % (1/9/2025  3:27 PM)      Today’s Treatment and Response:   Pt education was provided on exam findings, treatment diagnosis, treatment plan, expectations, and prognosis. Pt was also provided recommendations for activity modifications, possible soreness after evaluation, modalities as needed [ice/heat], postural corrections, ergonomics, detrimental fear avoidance behaviors, and importance of remaining active.  Patient was instructed in and issued a HEP for: HS stretches, piriformis stretches, hip flexor stretches, prone press up     Charges: PT Eval Moderate Complexity,       Total Timed Treatment: 45 min     Total Treatment Time: 45 min     Based on clinical rationale and outcome measures, this evaluation involved Moderate Complexity decision making due to 1-2 personal factors/comorbidities, 3 body structures involved/activity limitations, and evolving symptoms including changing pain levels.  PLAN OF CARE:    Goals: (to be met in 8 visits)    Not Met Progress Toward Partially Met Met   Pt will improve transversus abdominis recruitment to perform proper isometric contraction without requiring verbal or tactile cuing to promote advancement of therex. [] [] [] []   Pt will demonstrate good understanding of proper posture and body mechanics to decrease pain and improve spinal safety. [] [] [] []   Pt will improve lumbar spine AROM flexion WNL to allow increase ease with bending forward to  don shoes. [] [] [] []   Pt will report improved symptom centralization and absence of radicular symptoms for 3 consecutive days to improve function with ADLs. [] [] [] []   Pt will demonstrate improved core strength to be able to perform med to heavy with <2/10 pain. [] [] [] []   Pt will be independent and compliant with comprehensive HEP to maintain progress achieved in PT [] [] [] []         Frequency / Duration: Patient will be seen for 2 x/week or a total of 8 visits over a 90 day period. Treatment will include: Manual Therapy, Mechanical Traction, Neuromuscular Re-education, Therapeutic Activities, Therapeutic Exercise, and Home Exercise Program instruction, modalities prn for pain.    Education or treatment limitation: None  Rehab Potential:good    Patient/Family/Caregiver was advised of these findings, precautions, and treatment options and has agreed to actively participate in planning and for this course of care.    Thank you for your referral. Please co-sign or sign and return this letter via fax as soon as possible to [phone]. If you have any questions, please contact me at Dept: [phone]    Sincerely,  Electronically signed by therapist: Aydee Dangelo, PT    Physician's certification required: Yes  I certify the need for these services furnished under this plan of treatment and while under my care.    X___________________________________________________ Date____________________    Certification From: 1/9/2025  To:4/9/2025

## 2025-01-15 ENCOUNTER — OFFICE VISIT (OUTPATIENT)
Dept: FAMILY MEDICINE CLINIC | Facility: CLINIC | Age: 35
End: 2025-01-15
Payer: COMMERCIAL

## 2025-01-15 VITALS
OXYGEN SATURATION: 98 % | SYSTOLIC BLOOD PRESSURE: 118 MMHG | RESPIRATION RATE: 16 BRPM | HEIGHT: 72 IN | DIASTOLIC BLOOD PRESSURE: 68 MMHG | BODY MASS INDEX: 25.73 KG/M2 | TEMPERATURE: 99 F | HEART RATE: 68 BPM | WEIGHT: 190 LBS

## 2025-01-15 DIAGNOSIS — D22.9 MULTIPLE PIGMENTED NEVI: ICD-10-CM

## 2025-01-15 DIAGNOSIS — Z13.6 ENCOUNTER FOR LIPID SCREENING FOR CARDIOVASCULAR DISEASE: ICD-10-CM

## 2025-01-15 DIAGNOSIS — Z00.00 WELL ADULT EXAM: Primary | ICD-10-CM

## 2025-01-15 DIAGNOSIS — F41.1 GAD (GENERALIZED ANXIETY DISORDER): ICD-10-CM

## 2025-01-15 DIAGNOSIS — Z11.3 ROUTINE SCREENING FOR STI (SEXUALLY TRANSMITTED INFECTION): ICD-10-CM

## 2025-01-15 DIAGNOSIS — Z13.228 SCREENING FOR ENDOCRINE, NUTRITIONAL, METABOLIC AND IMMUNITY DISORDER: ICD-10-CM

## 2025-01-15 DIAGNOSIS — F33.0 MILD RECURRENT MAJOR DEPRESSION (HCC): ICD-10-CM

## 2025-01-15 DIAGNOSIS — K21.9 GASTROESOPHAGEAL REFLUX DISEASE WITHOUT ESOPHAGITIS: ICD-10-CM

## 2025-01-15 DIAGNOSIS — Z13.220 ENCOUNTER FOR LIPID SCREENING FOR CARDIOVASCULAR DISEASE: ICD-10-CM

## 2025-01-15 DIAGNOSIS — Z13.0 SCREENING FOR ENDOCRINE, NUTRITIONAL, METABOLIC AND IMMUNITY DISORDER: ICD-10-CM

## 2025-01-15 DIAGNOSIS — J06.9 ACUTE URI: ICD-10-CM

## 2025-01-15 DIAGNOSIS — Z79.899 MEDICATION MANAGEMENT: ICD-10-CM

## 2025-01-15 DIAGNOSIS — J34.2 DEVIATED SEPTUM: ICD-10-CM

## 2025-01-15 DIAGNOSIS — Z13.29 SCREENING FOR ENDOCRINE, NUTRITIONAL, METABOLIC AND IMMUNITY DISORDER: ICD-10-CM

## 2025-01-15 DIAGNOSIS — Z13.21 SCREENING FOR ENDOCRINE, NUTRITIONAL, METABOLIC AND IMMUNITY DISORDER: ICD-10-CM

## 2025-01-15 PROCEDURE — 87491 CHLMYD TRACH DNA AMP PROBE: CPT | Performed by: FAMILY MEDICINE

## 2025-01-15 PROCEDURE — 99213 OFFICE O/P EST LOW 20 MIN: CPT | Performed by: FAMILY MEDICINE

## 2025-01-15 PROCEDURE — 87591 N.GONORRHOEAE DNA AMP PROB: CPT | Performed by: FAMILY MEDICINE

## 2025-01-15 PROCEDURE — 99395 PREV VISIT EST AGE 18-39: CPT | Performed by: FAMILY MEDICINE

## 2025-01-15 RX ORDER — FAMOTIDINE 40 MG/1
40 TABLET, FILM COATED ORAL NIGHTLY PRN
Qty: 90 TABLET | Refills: 1 | Status: SHIPPED | OUTPATIENT
Start: 2025-01-15

## 2025-01-15 RX ORDER — FLUOXETINE 10 MG/1
10 CAPSULE ORAL DAILY
Qty: 90 CAPSULE | Refills: 1 | Status: SHIPPED | OUTPATIENT
Start: 2025-01-15

## 2025-01-15 NOTE — PROGRESS NOTES
Haris Wilhelm is a 34 year old male who presents for a complete physical exam.   HPI:   Patient is here for complete physical also has multiple concerns including URI, anxiety, depression, stiff lower back, migraines, palpitations.      --- Lower back pain  Was seen on 12//24 for lower back pain initially seen in emergency department on 11/30/2024 had normal lumbar x-ray  Does state that the pain is not as bad and does still plan on starting physical therapy.  Overall back pain is much improved feels the physical therapy will help has gone in for the assessment and plans ongoing for treatment this week.  Prior back pain hx: no prior back problems.   Has no numbness, tingling, or abdominal pain.  No change in urination or bowel movements.       --- Upper respiratory symptoms  Symptoms started about 6 days ago son had influenza type A he is starting to feel better never got tested for influenza deferred because he was taking care of his son.  Denies any shortness of breath, wheezing or chest pain.  Right-sided nasal congestion facial pain on the right has a severely deviated septum to the left which causes him to get chronic congestion.  Has not been using any Flonase or Mucinex yet.  Denies any fevers, chills or bodyaches no nausea, vomiting or diarrhea.    --- Follow-up anxiety/depression  On fluoxetine 20 mg restarted on 12/4/2024 prior to loss of insurance had been up to 40 mg last year  Tolerates the fluoxetine well would be wanting to increase to 30 mg due to persistent symptoms.  Never started to do counseling was given names of counselors through [facility] navigator still plans on scheduling  Still working as a  and is happy with his job did go through a divorce this year still is coping with the stressors.  1/16/2025 BRANT-7 is an 8 and PHQ 9 is at a 13  12//24 BRANT-7 is at a 12 and PHQ-9 is at a 14  Rhodes Suicide Severity Rating Scale Screener   In what setting is the screener performed?: an office  visit  1. Have you wished you were dead or wished you could go to sleep and not wake up? (past 30 days): No  2. Have you actually had any thoughts of killing yourself? (past 30 days): No  6. Have you ever done anything, started to do anything, or prepared to do anything to end your life? (lifetime): No  Score and Suggested Actions - Office Visit: No Risk  Score and Intervention  Score and Suggested Actions - Office Visit: No Risk    1. Little interest or pleasure in doing things: More than half the days  2. Feeling down, depressed, or hopeless: Several days  3. Trouble falling or staying asleep, or sleeping too much: More than half the days  4. Feeling tired or having little energy: More than half the days  5. Poor appetite or overeating: Several days  6. Feeling bad about yourself - or that you are a failure or have let yourself or your family down: Several days  7. Trouble concentrating on things, such as reading the newspaper or watching television: More than half the days  8. Moving or speaking so slowly that other people could have noticed. Or the opposite - being so fidgety or restless that you have been moving around a lot more than usual: More than half the days  9. Thoughts that you would be better off dead, or of hurting yourself in some way: Not at all  PHQ-9 TOTAL SCORE: 13  If you checked off any problems, how difficult have these problems made it for you to do your work, take care of things at home, or get along with other people?: Somewhat difficult      1/16/2025   EEH AMB BRANT-7    Feeling nervous, anxious, or on edge 0    Not being able to stop or control worrying 0    Worrying too much about different things    2    Trouble relaxing 2    Being so restless that it's hard to sit still 2    Becoming easily annoyed or irritable 1    Feeling afraid as if something awful might happen 1    BRANT 7 Total Score 8    If you checked off any problems, how difficult have these made it for you to do your work,  take care of things at home, or get along with other people? Somewhat difficult            ---GERD   Eating better and is feeling better has famotidine for symptoms if needed        Preventative care   Immunizations Tdap 12/21/2017  Dental exams 10 years ago overdue   Eye exams overdue  Dermatologist none  Depression and anxiety screening positive see above  Sleep Apnea  Snoring rare episodes wakes up gasping holding on sleep study   Exercise  Work holding due to back will d/w PT   DIet  overall good  Smoking history never  Alcohol history social/minimal  THC every other day edibles   Last colonoscopy never no family history of colon cancer.  Urination changes  No issues no BPH symptoms no family history of prostate cancer  ED symptoms no issues    Sexually active same partner 1 month requesting STI checking no symptoms      Past medical history ADHD, migraines, depression/BRANT  Past surgical history appendectomy 2015  Family history Father depression, dementia and sleep apnea; mother macular degeneration and arthritis; sister migraines, JRA, depression/anxiety history of suicide attempt; Brother migraines, anxiety, depression and suicide attempt  Social history he is , has a 5-year-old son, is a  at a car dealership, socially drinks alcohol, never smoked cigarettes, uses THC edibles every other day 10 mg usually at night after his son goes to sleep.    Wt Readings from Last 6 Encounters:   01/15/25 190 lb (86.2 kg)   11/30/24 180 lb (81.6 kg)   08/16/23 183 lb (83 kg)   07/11/23 181 lb (82.1 kg)   07/07/23 176 lb (79.8 kg)   07/06/23 178 lb (80.7 kg)     Body mass index is 25.77 kg/m².     Results for orders placed or performed in visit on 01/15/25   Chlamydia/Gc Amplification [E]    Collection Time: 01/15/25  4:52 PM    Specimen: Urine; Other   Result Value Ref Range    Chlamydia Trachomatis Amplified RNA Negative Negative    Neisseria Gonorrhoeae Amplified RNA Negative Negative    Chlam/GC Source  Urine        Current Outpatient Medications   Medication Sig Dispense Refill    FLUoxetine 10 MG Oral Cap Take 1 capsule (10 mg total) by mouth daily. Take with 20 mg for total of 30 mg 90 capsule 1    famotidine 40 MG Oral Tab Take 1 tablet (40 mg total) by mouth nightly as needed for Heartburn. 90 tablet 1    FLUoxetine 20 MG Oral Cap Take 1 capsule (20 mg total) by mouth daily. 90 capsule 0      Past Medical History:    ADHD (attention deficit hyperactivity disorder)    Migraines    Moderate major depression (HCC)    Palpitations      Past Surgical History:   Procedure Laterality Date    Appendectomy  2015    At [facility], laparoscopic    Plastic surgery, neck  01/01/2008      Family History   Problem Relation Age of Onset    Macular degeneration Mother     Arthritis Mother     Dementia Father     Other (valve) Father     Other (dementia) Father     Migraines Sister     Juvenile Rheumatoid Arthritis Sister     Migraines Brother     Other (Scoliosis) Maternal Grandmother     Dementia Paternal Grandfather       Social History:  Social History     Socioeconomic History    Marital status:    Tobacco Use    Smoking status: Never     Passive exposure: Never    Smokeless tobacco: Never   Vaping Use    Vaping status: Never Used   Substance and Sexual Activity    Alcohol use: Yes     Comment: 1 drink weekly    Drug use: Yes     Frequency: 7.0 times per week     Types: Cannabis     Comment: edibles weed weekly    Sexual activity: Yes     Partners: Female   Other Topics Concern    Caffeine Concern Yes     Comment: 1 cup of coffee daily and 3 red bulls daily    Exercise No    Seat Belt Yes    Special Diet No    Stress Concern Yes    Weight Concern No     Service No    Blood Transfusions No    Occupational Exposure No    Hobby Hazards Yes    Sleep Concern Yes    Back Care No    Bike Helmet Yes    Self-Exams No      Occ: . : Yes. Children: 5-year-old son.   Exercise: minimal.  Diet:  watches minimally     REVIEW OF SYSTEMS:   GENERAL: feels well overall, denies fever or chills, denies change in weight  SKIN: denies any unusual skin lesions  EYES: denies changes in vision  HENT: see above upper respiratory symptoms  LUNGS: denies STEVIE, wheezing, cough, orthopnea and PND  CARDIOVASCULAR: denies CP, palpitations, rapid or slow heart rate. Denies ROJAS/lower extremity swelling  GI: denies abdominal pain,denies heartburn, denies n/v/c/d/change in stools/blood in stool/black stool/change in appetite  : denies nocturia or changes in urinary stream, denies scrotal mass/abnormal discharge from urethra  MUSCULOSKELETAL: denies joint or muscle aches or pains  NEURO: denies headaches/dizziness  PSYCH: denies depression or anxiety  HEMATOLOGIC: denies easy bruising/bleeding/anemia/blood clot disorders  ENDOCRINE: denies weight gain/weight loss/enlargement of neck glands/polyuria/polydypsia  ALL/ASTHMA: denies allergic rhinitis/asthma    EXAM:   /68 (BP Location: Right arm, Patient Position: Sitting, Cuff Size: adult)   Pulse 68   Temp 98.6 °F (37 °C) (Temporal)   Resp 16   Ht 6' (1.829 m)   Wt 190 lb (86.2 kg)   SpO2 98%   BMI 25.77 kg/m²   Body mass index is 25.77 kg/m².   GENERAL: NAD, pleasant, well developed, normal voice does appear anxious  SKIN: no rashes, no suspicious moles or lesions  HENT: NCAT, EACs clear b/l, TMs normal b/l, nasal turbinatess swelling with a deviated septum to the left l, oropharynx with clear postnasal drainage , gingiva normal, lips normal  EYES: PERRLA, EOMI, conjunctiva non-injected and non-icteric  NECK: supple, no adenopathy/thyromegaly/thyroid nodules/masses  CHEST: no chest tenderness  BREAST: no suspicious masses appreciated on palpation  LUNGS: CTA A/P, no wheezes/ronchi/rales/crackles, normal air excursion  CARDIOVASCULAR: RRR, no murmur, no lower extremity edema, pedal and femoral pulses 2+ and symmetric b/l  no palpitations auscultated   GI: normoactive  BS, non-distended, non-tender to palpation, no HSM/masses/pulsations  : Bilateral descended testes no testicular mass no inguinal hernia   MUSCULOSKELETAL: Back with normal AROM, no joint swelling, extremities x 4 with normal strength 5/5 and symmetric and with normal AROM/PROM.   EXTREMITIES: no cyanosis, clubbing or edema  NEURO: A&O x3, CN II-XII grossly intact. Reflexes: biceps and patellar 2+ b/l and symmetric. Gait is normal.  PSYCH: Patient's mood is anxious affect is normal he is very pleasant good communication skills does get distracted easily    Immunization History   Administered Date(s) Administered    Covid-19 Vaccine Pfizer 30 mcg/0.3 ml 04/10/2021, 05/02/2021    FLULAVAL 6 months & older 0.5 ml Prefilled syringe (43924) 11/28/2022    Influenza 12/21/2017    TDAP 08/24/2016, 12/21/2017   Pended Date(s) Pended    Influenza Vaccine Refused 12/04/2024       ASSESSMENT AND PLAN:   Haris Wilhelm is a 34 year old male who presents for a complete physical exam.   Encounter Diagnoses   Name Primary?    Well adult exam Yes    BRANT (generalized anxiety disorder)     Mild recurrent major depression (HCC)     Routine screening for STI (sexually transmitted infection)     Screening for endocrine, nutritional, metabolic and immunity disorder     Multiple pigmented nevi     Encounter for lipid screening for cardiovascular disease     Gastroesophageal reflux disease without esophagitis     Medication management        Orders Placed This Encounter   Procedures    CBC With Differential With Platelet    Comp Metabolic Panel (14)    Lipid Panel    TSH and Free T4    HIV Ag/Ab Combo    Magnesium [E]    RPR W/REF TO TITER & CONFIRM [35750][Q]    Hepatitis Panel, Acute (4) [E]    Chlamydia/Gc Amplification [E]       Meds & Refills for this Visit:  Requested Prescriptions     Signed Prescriptions Disp Refills    FLUoxetine 10 MG Oral Cap 90 capsule 1     Sig: Take 1 capsule (10 mg total) by mouth daily. Take with 20 mg for  total of 30 mg    famotidine 40 MG Oral Tab 90 tablet 1     Sig: Take 1 tablet (40 mg total) by mouth nightly as needed for Heartburn.       Imaging & Consults:  DERM - EXTERNAL  1. Well adult exam  Start PT as planned for back pain as discussed last office visit  Recommend healthy diet including green leafy vegetables, fresh fruits and lean meats.  Aerobic exercise 30 minutes five days a week for cardiovascular fitness and 45-60 minutes 6-7 days a week for weight loss. Advised testicular self exam once a month.    2. BRANT (generalized anxiety disorder)  Reviewed medication benefits and side effects.   Discussed if any problems call office immediately especially if gets increased depression, anxiety or any homicidal or suicidal symptoms.   Continue with fluoxetine increased to 30 mg daily  Start counseling  - FLUoxetine 10 MG Oral Cap; Take 1 capsule (10 mg total) by mouth daily. Take with 20 mg for total of 30 mg  Dispense: 90 capsule; Refill: 1    3. Mild recurrent major depression (HCC)  As above  - FLUoxetine 10 MG Oral Cap; Take 1 capsule (10 mg total) by mouth daily. Take with 20 mg for total of 30 mg  Dispense: 90 capsule; Refill: 1    4. Routine screening for STI (sexually transmitted infection)  Condoms for STI protection  - Chlamydia/Gc Amplification [E]  - HIV Ag/Ab Combo  - RPR W/REF TO TITER & CONFIRM [76332][Q]  - Hepatitis Panel, Acute (4) [E]    5. Screening for endocrine, nutritional, metabolic and immunity disorder  - CBC With Differential With Platelet  - Comp Metabolic Panel (14)  - TSH and Free T4  - Magnesium [E]    6. Multiple pigmented nevi  - Derm Referral - External    7. Encounter for lipid screening for cardiovascular disease  - Lipid Panel    8. Gastroesophageal reflux disease without esophagitis  GERD prevention reviewed avoid caffeine, alcohol, and nonstnoeroidals.  Eat small frequent meals and avoid eating 3-4 hours before bedtime, try to raise the head of bed.    - famotidine 40 MG  Oral Tab; Take 1 tablet (40 mg total) by mouth nightly as needed for Heartburn.  Dispense: 90 tablet; Refill: 1    9. Medication management  As above  10.  Acute URI deviated septum   Flonase nasal spray and Mucinex guaifenesin 1200 mg Every 12 hours  If sinus pain does worsen and symptoms are lasting more than 10 to 14 days antibiotic can be considered    Time spent was 50 minutes more than 50% was spent on counseling regarding medical conditions and treatment.  Rest of time was spent reviewing chart, reviewing blood work and radiology tests.       The patient verbalizes understanding of these recommendations and agrees to the plan.  The patient is asked to return 2 months for follow-up on chronic health issues

## 2025-01-16 PROBLEM — J34.2 DEVIATED SEPTUM: Status: ACTIVE | Noted: 2025-01-16

## 2025-01-16 LAB
C TRACH DNA SPEC QL NAA+PROBE: NEGATIVE
N GONORRHOEA DNA SPEC QL NAA+PROBE: NEGATIVE

## 2025-01-21 ENCOUNTER — TELEPHONE (OUTPATIENT)
Dept: PHYSICAL THERAPY | Facility: HOSPITAL | Age: 35
End: 2025-01-21

## 2025-01-23 ENCOUNTER — APPOINTMENT (OUTPATIENT)
Dept: PHYSICAL THERAPY | Age: 35
End: 2025-01-23
Attending: FAMILY MEDICINE
Payer: COMMERCIAL

## 2025-01-27 ENCOUNTER — APPOINTMENT (OUTPATIENT)
Dept: PHYSICAL THERAPY | Age: 35
End: 2025-01-27
Attending: FAMILY MEDICINE
Payer: COMMERCIAL

## 2025-01-29 ENCOUNTER — APPOINTMENT (OUTPATIENT)
Dept: PHYSICAL THERAPY | Age: 35
End: 2025-01-29
Attending: FAMILY MEDICINE
Payer: COMMERCIAL

## 2025-01-30 ENCOUNTER — APPOINTMENT (OUTPATIENT)
Dept: PHYSICAL THERAPY | Age: 35
End: 2025-01-30
Attending: FAMILY MEDICINE
Payer: COMMERCIAL

## 2025-02-03 ENCOUNTER — APPOINTMENT (OUTPATIENT)
Dept: PHYSICAL THERAPY | Age: 35
End: 2025-02-03
Attending: FAMILY MEDICINE
Payer: COMMERCIAL

## 2025-03-01 DIAGNOSIS — F41.1 GAD (GENERALIZED ANXIETY DISORDER): ICD-10-CM

## 2025-03-20 ENCOUNTER — TELEPHONE (OUTPATIENT)
Dept: FAMILY MEDICINE CLINIC | Facility: CLINIC | Age: 35
End: 2025-03-20

## 2025-03-20 NOTE — TELEPHONE ENCOUNTER
Haris Wilhelm was scheduled for an appt on 3/19/2025 with a visit reason of medication.    Letter/MyChart message sent to pt notifying of missed appointment with $40 no show fee.

## 2025-04-28 DIAGNOSIS — K21.9 GASTROESOPHAGEAL REFLUX DISEASE WITHOUT ESOPHAGITIS: ICD-10-CM

## 2025-04-28 DIAGNOSIS — F41.1 GAD (GENERALIZED ANXIETY DISORDER): ICD-10-CM

## 2025-04-28 DIAGNOSIS — F33.0 MILD RECURRENT MAJOR DEPRESSION: ICD-10-CM

## 2025-04-28 RX ORDER — FAMOTIDINE 40 MG/1
40 TABLET, FILM COATED ORAL NIGHTLY PRN
Qty: 90 TABLET | Refills: 1 | OUTPATIENT
Start: 2025-04-28

## 2025-04-28 RX ORDER — FLUOXETINE 10 MG/1
10 CAPSULE ORAL DAILY
Qty: 90 CAPSULE | Refills: 1 | OUTPATIENT
Start: 2025-04-28

## 2025-05-11 DIAGNOSIS — F41.1 GAD (GENERALIZED ANXIETY DISORDER): ICD-10-CM

## (undated) NOTE — LETTER
3/20/2025    Haris Wilhelm  [address] [facility] [address]    Dear Haris,    We would like to inform you that your account has been charged $40 for not showing up to the office for your scheduled appointment on 03/19/2025.    Our no-show policy is as follows: A 24-hour notice is required, or you may be charged a $40 No Show fee.      If you are unable to keep your scheduled appointment, please notify us at least 24 hours in advance so we can accommodate our other patients. You may also reschedule your appointment at that time.    On the third no-show, within a 12-month period, it will be the physician’s discretion as to whether a discharge letter will be sent out disengaging you from the practice and giving you 30 days to enroll with a new non [facility] physician.    If you would like to contest this charge, please call [phone].    Sincerely,  [facility]

## (undated) NOTE — ED AVS SNAPSHOT
Arminda Dugan   MRN: [id number]    Department:  THE [facility] Emergency Department in [place]   Date of Visit:  12/6/2017           Disclosure     Insurance plans vary and the physician(s) referred by the ER may not be covered by your plan.  Please contact tell this physician (or your personal doctor if your instructions are to return to your personal doctor) about any new or lasting problems. The primary care or specialist physician will see patients referred from the [facility] Emergency Department.  Yves Coleman

## (undated) NOTE — LETTER
Date: 12/4/2024    Patient Name: Haris Wilhelm          To Whom it may concern:    The above patient was seen at [facility] for treatment of a medical condition.    Allow him to work but with the following limitations no bending, lifting greater than 15 lb and avoid twisting for 2 weeks .        Sincerely,    Angy Peterson PA-C